# Patient Record
Sex: FEMALE | Race: WHITE | HISPANIC OR LATINO | Employment: UNEMPLOYED | URBAN - METROPOLITAN AREA
[De-identification: names, ages, dates, MRNs, and addresses within clinical notes are randomized per-mention and may not be internally consistent; named-entity substitution may affect disease eponyms.]

---

## 2017-01-09 ENCOUNTER — HOSPITAL ENCOUNTER (EMERGENCY)
Facility: CLINIC | Age: 22
Discharge: HOME | End: 2017-01-09
Attending: EMERGENCY MEDICINE | Admitting: EMERGENCY MEDICINE
Payer: COMMERCIAL

## 2017-01-09 VITALS
DIASTOLIC BLOOD PRESSURE: 78 MMHG | RESPIRATION RATE: 16 BRPM | SYSTOLIC BLOOD PRESSURE: 127 MMHG | TEMPERATURE: 98.3 F | OXYGEN SATURATION: 99 %

## 2017-01-09 DIAGNOSIS — T74.21XA SEXUAL ASSAULT OF ADULT, INITIAL ENCOUNTER: ICD-10-CM

## 2017-01-09 LAB
ALBUMIN UR-MCNC: 30 MG/DL
APPEARANCE UR: CLEAR
BILIRUB UR QL STRIP: NEGATIVE
COLOR UR AUTO: ABNORMAL
GLUCOSE UR STRIP-MCNC: NEGATIVE MG/DL
HCG UR QL: NEGATIVE
HCG UR QL: NEGATIVE
HGB UR QL STRIP: ABNORMAL
INTERNAL QC OK POCT: YES
KETONES UR STRIP-MCNC: 80 MG/DL
LEUKOCYTE ESTERASE UR QL STRIP: NEGATIVE
MUCOUS THREADS #/AREA URNS LPF: PRESENT /LPF
NITRATE UR QL: NEGATIVE
PH UR STRIP: 6 PH (ref 5–7)
RBC #/AREA URNS AUTO: >182 /HPF (ref 0–2)
SP GR UR STRIP: 1.03 (ref 1–1.03)
SQUAMOUS #/AREA URNS AUTO: 3 /HPF (ref 0–1)
URN SPEC COLLECT METH UR: ABNORMAL
UROBILINOGEN UR STRIP-MCNC: NORMAL MG/DL (ref 0–2)
WBC #/AREA URNS AUTO: 14 /HPF (ref 0–2)

## 2017-01-09 PROCEDURE — 96372 THER/PROPH/DIAG INJ SC/IM: CPT | Performed by: EMERGENCY MEDICINE

## 2017-01-09 PROCEDURE — 99285 EMERGENCY DEPT VISIT HI MDM: CPT | Mod: 25 | Performed by: EMERGENCY MEDICINE

## 2017-01-09 PROCEDURE — 81025 URINE PREGNANCY TEST: CPT | Performed by: EMERGENCY MEDICINE

## 2017-01-09 PROCEDURE — 25000132 ZZH RX MED GY IP 250 OP 250 PS 637: Performed by: EMERGENCY MEDICINE

## 2017-01-09 PROCEDURE — 99284 EMERGENCY DEPT VISIT MOD MDM: CPT | Mod: Z6 | Performed by: EMERGENCY MEDICINE

## 2017-01-09 PROCEDURE — 25900017 H RX MED GY IP 259 OP 259 PS 637: Performed by: EMERGENCY MEDICINE

## 2017-01-09 PROCEDURE — 81001 URINALYSIS AUTO W/SCOPE: CPT | Performed by: EMERGENCY MEDICINE

## 2017-01-09 PROCEDURE — 25000125 ZZHC RX 250: Performed by: EMERGENCY MEDICINE

## 2017-01-09 RX ORDER — AZITHROMYCIN 250 MG/1
1000 TABLET, FILM COATED ORAL ONCE
Status: COMPLETED | OUTPATIENT
Start: 2017-01-09 | End: 2017-01-09

## 2017-01-09 RX ORDER — LEVONORGESTREL 1.5 MG/1
1.5 TABLET ORAL ONCE
Status: DISCONTINUED | OUTPATIENT
Start: 2017-01-09 | End: 2017-01-09

## 2017-01-09 RX ORDER — CEFTRIAXONE SODIUM 1 G
250 VIAL (EA) INJECTION ONCE
Status: COMPLETED | OUTPATIENT
Start: 2017-01-09 | End: 2017-01-09

## 2017-01-09 RX ORDER — METRONIDAZOLE 500 MG/1
2000 TABLET ORAL ONCE
Qty: 4 TABLET | Refills: 0 | Status: SHIPPED | OUTPATIENT
Start: 2017-01-09 | End: 2017-01-09

## 2017-01-09 RX ORDER — ONDANSETRON 8 MG/1
8 TABLET, ORALLY DISINTEGRATING ORAL ONCE
Status: COMPLETED | OUTPATIENT
Start: 2017-01-09 | End: 2017-01-09

## 2017-01-09 RX ORDER — EMTRICITABINE AND TENOFOVIR DISOPROXIL FUMARATE 200; 300 MG/1; MG/1
1 TABLET, FILM COATED ORAL DAILY
Qty: 28 TABLET | Refills: 0 | Status: SHIPPED | OUTPATIENT
Start: 2017-01-09 | End: 2021-10-27

## 2017-01-09 RX ORDER — ONDANSETRON 4 MG/1
4 TABLET, ORALLY DISINTEGRATING ORAL EVERY 8 HOURS PRN
Qty: 20 TABLET | Refills: 0 | Status: SHIPPED | OUTPATIENT
Start: 2017-01-09 | End: 2017-01-12

## 2017-01-09 RX ADMIN — AZITHROMYCIN 1000 MG: 250 TABLET, FILM COATED ORAL at 22:08

## 2017-01-09 RX ADMIN — CEFTRIAXONE SODIUM 250 MG: 1 INJECTION, POWDER, FOR SOLUTION INTRAMUSCULAR; INTRAVENOUS at 22:33

## 2017-01-09 RX ADMIN — ONDANSETRON 8 MG: 8 TABLET, ORALLY DISINTEGRATING ORAL at 22:08

## 2017-01-09 RX ADMIN — ULIPRISTAL ACETATE 30 MG: 30 TABLET ORAL at 22:09

## 2017-01-09 RX ADMIN — Medication 1 PACKAGE: at 22:46

## 2017-01-09 ASSESSMENT — ENCOUNTER SYMPTOMS
WOUND: 1
ANAL BLEEDING: 1
RECTAL PAIN: 1
FEVER: 0
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
## 2017-01-09 NOTE — ED AVS SNAPSHOT
[facility], [place], Emergency Department    [address] [place] [place] [address]    Phone:  [phone]    Fax:  [phone]                                       Sarah Churchill   MRN: [id number]    Department:  [facility], Emergency Department   Date of Visit:  1/9/2017           After Visit Summary Signature Page     I have received my discharge instructions, and my questions have been answered. I have discussed any challenges I see with this plan with the nurse or doctor.    ..........................................................................................................................................  Patient/Patient Representative Signature      ..........................................................................................................................................  Patient Representative Print Name and Relationship to Patient    ..................................................               ................................................  Date                                            Time    ..........................................................................................................................................  Reviewed by Signature/Title    ...................................................              ..............................................  Date                                                            Time

## 2017-01-10 NOTE — DISCHARGE INSTRUCTIONS
See your primary care physician in one week as directed  Take the HIV prophylaxis medications as directed

## 2018-01-07 ENCOUNTER — HEALTH MAINTENANCE LETTER (OUTPATIENT)
Age: 23
End: 2018-01-07

## 2019-02-07 ENCOUNTER — TRANSFERRED RECORDS (OUTPATIENT)
Dept: HEALTH INFORMATION MANAGEMENT | Facility: CLINIC | Age: 24
End: 2019-02-07

## 2019-02-07 LAB — PAP-ABSTRACT: NORMAL

## 2019-03-09 ENCOUNTER — HEALTH MAINTENANCE LETTER (OUTPATIENT)
Age: 24
End: 2019-03-09

## 2020-03-10 ENCOUNTER — HEALTH MAINTENANCE LETTER (OUTPATIENT)
Age: 25
End: 2020-03-10

## 2020-12-27 ENCOUNTER — HEALTH MAINTENANCE LETTER (OUTPATIENT)
Age: 25
End: 2020-12-27

## 2021-04-24 ENCOUNTER — HEALTH MAINTENANCE LETTER (OUTPATIENT)
Age: 26
End: 2021-04-24

## 2021-09-03 ENCOUNTER — OFFICE VISIT (OUTPATIENT)
Dept: URGENT CARE | Facility: URGENT CARE | Age: 26
End: 2021-09-03

## 2021-09-03 VITALS
SYSTOLIC BLOOD PRESSURE: 138 MMHG | HEART RATE: 78 BPM | DIASTOLIC BLOOD PRESSURE: 86 MMHG | TEMPERATURE: 98.3 F | OXYGEN SATURATION: 100 %

## 2021-09-03 DIAGNOSIS — Z72.0 TOBACCO USE: ICD-10-CM

## 2021-09-03 DIAGNOSIS — H60.332 ACUTE SWIMMER'S EAR OF LEFT SIDE: Primary | ICD-10-CM

## 2021-09-03 PROCEDURE — 99203 OFFICE O/P NEW LOW 30 MIN: CPT | Performed by: NURSE PRACTITIONER

## 2021-09-03 RX ORDER — OFLOXACIN 3 MG/ML
10 SOLUTION AURICULAR (OTIC) DAILY
Qty: 4 ML | Refills: 0 | Status: SHIPPED | OUTPATIENT
Start: 2021-09-03 | End: 2021-09-10

## 2021-09-03 NOTE — PATIENT INSTRUCTIONS
Patient Education     External Ear Infection (Adult)    External otitis (also called  swimmer s ear ) is an infection in the ear canal. It's often caused by bacteria or fungus. It can occur a few days after water gets trapped in the ear canal (from swimming or bathing). It can also occur after cleaning too deeply in the ear canal with a cotton swab or other object. Sometimes, hair care products get into the ear canal and cause this problem.   Symptoms can include pain, fever, itching, redness, drainage, or swelling of the ear canal. Temporary hearing loss may also occur.   Home care    Don't try to clean the ear canal. This can push pus and bacteria deeper into the canal.    Use prescribed ear drops as directed. These help reduce swelling and fight the infection. If an ear wick was placed in the ear canal, apply drops right onto the end of the wick. The wick will draw the medicine into the ear canal even if it's swollen closed.    A cotton ball may be loosely placed in the outer ear to absorb any drainage.    You may use over-the-counter medicines to control pain as directed by the healthcare provider, unless another medicine was prescribed. Talk with your provider before using these medicines if you have chronic liver or kidney disease or ever had a stomach ulcer or digestive tract bleeding.    Don't allow water to get into your ear when bathing. Also don't swim until the infection has cleared.    Prevention    Keep your ears dry. This helps lower the risk of infection. Dry your ears with a towel or hair dryer after getting wet. Also, use ear plugs when swimming.    Don't stick any objects in the ear to remove wax.    Talk with your provider about using ear drops to prevent swimmer's ear in case you feel water trapped in your ear canal. You can get these drops over the counter at most drugstores. They work by removing water from the ear canal.    Follow-up care  Follow up with your healthcare provider in 1 week,  or as advised.   When to seek medical advice  Call your healthcare provider right away if any of these occur:     Ear pain becomes worse or doesn t improve after 3 days of treatment    Redness or swelling of the outer ear occurs or gets worse    Headache    Fever of 100.4 F (38 C) or higher, or as directed by your healthcare provider  Call 911  Call 911 or get immediate medical care if any of the following occur:     Seizure    Unusual drowsiness or confusion    Unusual painful or stiff neck    [employer] last reviewed this educational content on 8/1/2020 2000-2021 [employer]. All rights reserved. This information is not intended as a substitute for professional medical care. Always follow your healthcare professional's instructions.

## 2021-09-03 NOTE — PROGRESS NOTES
Assessment & Plan     Acute swimmer's ear of left side    - ofloxacin (FLOXIN) 0.3 % otic solution  Dispense: 4 mL; Refill: 0    Tobacco use       Discussed swimmer's ear, otitis externa. Prescription sent to pharmacy for ofloxacin daily for 7-10 days, may stop when asymptomatic for 48 hours. Rest, warm pack, tylenol, ibuprofen as needed. No swimming until symptoms resolve. Tobacco cessation encouraged.    Follow-up with PCP if symptoms persist for 3 days, and sooner if symptoms worsen or new symptoms develop.     Discussed red flag symptoms which warrant immediate visit in emergency room    All questions were answered and patient verbalized understanding. AVS reviewed with patient.     Sherrill Smith, DNP, APRN, CNP 9/3/2021 6:09 PM  [facility]            Baljeet Smith is a 25 year old female who presents to clinic today for the following health issues:  Chief Complaint   Patient presents with     Ear Problem     Left ear has pain. Pain travels down the neck and the jaw. Started 3 days ago.      Patient presents for evaluation of left ear pain for the past 3 days which has been worsening. Pain radiates down to her left neck. Denies fever, chills, cough, runny nose. She tried over the counter ear pain drops which help temporarily. No recent swimming. She was diagnosed with right otitis externa which resolved on its own a few weeks ago. Pain is not worse with swallowing. She does use tobacco.     Problem list, Medication list, Allergies, and Medical history reviewed in EPIC.    ROS:  Review of systems negative except for noted above        Objective    /86   Pulse 78   Temp 98.3  F (36.8  C) (Tympanic)   SpO2 100%   Physical Exam  Constitutional:       General: She is not in acute distress.     Appearance: She is not toxic-appearing or diaphoretic.   HENT:      Head: Normocephalic and atraumatic.      Right Ear: Tympanic membrane, ear canal and external ear normal. No  mastoid tenderness.      Left Ear: Tympanic membrane normal. No mastoid tenderness.      Ears:      Comments: Tenderness with palpation left tragus and pinna. Left ear canal erythematous, moderately swollen, with purulent drainage     Nose: Nose normal.      Mouth/Throat:      Mouth: Mucous membranes are moist.      Pharynx: Oropharynx is clear. No oropharyngeal exudate or posterior oropharyngeal erythema.   Eyes:      General:         Right eye: No discharge.         Left eye: No discharge.   Lymphadenopathy:      Cervical: No cervical adenopathy.   Neurological:      Mental Status: She is alert.

## 2021-10-09 ENCOUNTER — HEALTH MAINTENANCE LETTER (OUTPATIENT)
Age: 26
End: 2021-10-09

## 2021-10-27 ENCOUNTER — OFFICE VISIT (OUTPATIENT)
Dept: FAMILY MEDICINE | Facility: CLINIC | Age: 26
End: 2021-10-27
Payer: MEDICAID

## 2021-10-27 VITALS
DIASTOLIC BLOOD PRESSURE: 70 MMHG | OXYGEN SATURATION: 99 % | WEIGHT: 234.4 LBS | BODY MASS INDEX: 43.13 KG/M2 | HEIGHT: 62 IN | HEART RATE: 75 BPM | TEMPERATURE: 99.4 F | SYSTOLIC BLOOD PRESSURE: 120 MMHG | RESPIRATION RATE: 16 BRPM

## 2021-10-27 DIAGNOSIS — E66.01 MORBID OBESITY (H): ICD-10-CM

## 2021-10-27 DIAGNOSIS — R23.4 BREAST SKIN CHANGES: Primary | ICD-10-CM

## 2021-10-27 DIAGNOSIS — Z72.0 TOBACCO USE: ICD-10-CM

## 2021-10-27 PROCEDURE — 99203 OFFICE O/P NEW LOW 30 MIN: CPT | Performed by: NURSE PRACTITIONER

## 2021-10-27 ASSESSMENT — ENCOUNTER SYMPTOMS
CARDIOVASCULAR NEGATIVE: 1
PSYCHIATRIC NEGATIVE: 1
NEUROLOGICAL NEGATIVE: 1
CONSTITUTIONAL NEGATIVE: 1
EYES NEGATIVE: 1
GASTROINTESTINAL NEGATIVE: 1
ALLERGIC/IMMUNOLOGIC NEGATIVE: 1
MUSCULOSKELETAL NEGATIVE: 1
RESPIRATORY NEGATIVE: 1

## 2021-10-27 ASSESSMENT — MIFFLIN-ST. JEOR: SCORE: 1771.48

## 2021-10-27 ASSESSMENT — PAIN SCALES - GENERAL: PAINLEVEL: NO PAIN (0)

## 2021-10-27 NOTE — PATIENT INSTRUCTIONS
Over the counter Debrox ear drops to help with ear wax removal    Learning to [place]      Coping is the key to successfully living a life without cigarettes.    You have unconsciously connected smoking with many behaviors and feeling that you experience every day of your life.  Engaging in that behavior or experiencing that feeling automatically triggers a desire for a cigarette. Unless you do something to prevent those urges from occurring and learn to deal with the urges that do occur, you may be tempted back to smoking. Coping breaks all those connections and allows you to live a life free of cigarettes.  Coping does not mean that you have to completely stop living your life and join a monastery! It does mean that you must work at changing how you do many of the routines that prompt you to smoke. It also means changing how you think in those tempting situations.  These techniques are all simple and doable. But they are powerful. Research and practical experience has proven time and again that these techniques help to eliminate urges as well as give you the tools to deal with urges that manage to slip through.  Throughout the next few pages you will find literally hundreds of suggestions on how to deal with situations that trigger most smokers to smoke.  Think about the situations where you have been especially tempted to smoke in the past. Refer to the coping suggestions for each of those situations. Then, determine the best coping choices for you. These techniques will be your  weapons of choice  the next time you encounter that situation. It is important to pick one coping technique to change what you do and one to change how you think for each trigger. Combining techniques makes them even stronger and increases your ability to successfully cope.  Even though there are plenty of excellent coping suggestions here, these are by no means all the techniques that exist. So, if you have an idea that s not listed here  don t be afraid to use it. Be creative!  Finally remember that no matter how many excellent ideas you come up with you must actually put them into practice. Work at this for at least six to eight weeks and you ll quickly learn to deal with any tempting situation that may come along!        Coping Menu    Preventing Urges    There are many things you can do before you get into a tempting situation to eliminate the urge to smoke.    Visualize yourself comfortably dealing with the situation without a cigarette.    Plan ahead. Know what you will do in any given situation before you encounter it. Practice that plan often.    Avoid the situation until you feel you can deal with it.    Change the routines you associate with smoking as much as possible.    Rethink your belief that smoking somehow makes your life better or helps you deal with all your problems.    Begin an exercise program. If you can t do anything else just walk as briskly as you can every day for half an hour.    Keep yourself busy. Avoid boring situations where you may begin to think about smoking.    Remind yourself often that you are happy being a nonsmoker and that life is much better without cigarettes.  Coping with temptation  However, sometimes the urge manages to come through. You must be ready to cope with that urge as it s happening. The following suggestions will help you deal with that urge so you aren t tempted back to cigarettes.  General Suggestions    Deep Breathing. Every time an urge hits take in a slow deep breath, hold it for three to five seconds and then slowly exhale.    Drink a glass of water.    Talk about the urge. Call your support person or let people around you know you need to talk for a few minutes.    Escape the situation. Leave until you feel comfortable going back.    Picture a stop sign in your head or say the word loudly to yourself.    Count to twenty!    Say to yourself,  I am in control  or  I can get through  this.     Just accept the though. It s natural that you will have thoughts about cigarettes once you quit. Don t make a big deal out of them. Say to yourself  So what  and let the thought go.    Specific Situations  After Meals    Get up from the table as soon as you are done eating    Brush your teeth after every meal    Always sit in the nonsmoking section of a restaurant    At home have dessert and coffee in a different place from dinner    Take a short walk after each meal  Alcohol    Explore alternative ways to socialize with friends  o Go to a movie  o Work out together  o Have a party without alcohol    If you choose to drink  o Change what you usually drink  o Limit yourself to one or two drinks  o Talk about the urges when they occur  o Leave the bar periodically for fresh air (Do some deep breathing while outside).    Decide not to go to a bar for at least the first few weeks of your quit    Remind yourself that you can have fun without drinking. Millions of people do it all the time!  Boredom    Always carry a book/newspaper/crossword puzzle with you    Plan ahead so that you will not have long periods of inactivity    Learn to enjoy doing nothing from time to time. You do not always have to be doing something important    Use idle time to make the grocery list, plan your schedule or write letters    Start a new hobby or begin an exercise program to fill the time.  Breaks    Take your break at a different time    Change the place where you take your break    Take a short walk instead of staying indoors    Do a crossword puzzle or read a novel    Realize that you don t need an excuse to take a break. You deserve it!      Car    Choose a slightly different route for routine trips    Remove the ashtray from the car    Listen to a talk radio station or books on tape to keep your mind occupied    Use public transportation for the first few weeks after you quit    Change the environment in the car. Clean the  entire interior; get new seat covers, put up a no smoking sign, etc.  Coffee    Drink a flavored coffee or a different brand    Drink coffee out of a glass, paper cup, or the good china you never use    Change where you have your coffee breaks at work    If you always have your morning coffee at home have it at a café or at work    Drink tea instead of coffee  Evenings    Find projects to do while at home. Clean out the basement, refinish furniture, etc.    Keep yourself occupied while watching TV. Do puzzles, make out the grocery list, read a magazine    Visit family or friends instead of staying at home    Begin a new hobby or volunteer at a worthwhile organization    Start an exercise program. If you can t do anything else, take a brisk half hour walk each night  Hand/Mouth    Use cinnamon sticks (the kind used for cider)    Suck on sugar free candy    Use straws/swizzle sticks/tooth picks    Chew strong tangy sugar free gum    Eat carrots or celery sticks  Living with another smoker    Negotiate with the other smoker about where and when he/she will smoke. Do not make demands    Have the other smoker keep his/her cigarettes where you will not be able to find them    Practice saying  No thank you, I don t  smoke   just in case someone offers you a cigarette    Don t drink alcohol or limit yourself to one or two drinks    Have a support person with you at the party    Stress Management    Separate cigarettes from the situation. Realize that smoking never made a situation any better or helped you deal with it.    Step back, take a deep breath, and say to yourself,  I can handle this.  Then deal with the problem.    Strategize about how to handle stressful situations with friends, relatives or trusted clergy before encountering those situations.    Realize that every problem has a solution that does not involve smoking.    Begin an exercise program, take a formal stress management class or learn to meditate.    Telephone    Stand instead of sit    Move the location of the phone    If you don t already have one, get a portable phone or a cell phone    Hold the phone in the hand opposite of the one you usually use    Limit your time on the phone (use email instead)!  Thoughts about smoking    Just because you think about something does not mean you have to do it. Remember, if you did everything you ever thought about you would be in [facility] right now!!    Don t focus on the thought. Distract yourself:  o Say to yourself  I am in control  and let the thought go  o Remind yourself of the benefits of quitting  o Think of the reason you quit. Focus on that  o Say the word stop or picture a stop sign    Accept the thoughts. You naturally will be thinking about cigarettes for a while after you quit. Say to yourself,  So what  and move on    See yourself in your mind s eye as a successful nonsmoker. Practice seeing yourself in all kinds of situations dealing effectively without smoking    Give the smoker one ashtray. They will keep this ashtray clean and out of your sight when not in use    Determine a reasonable length of time for these changes    Surprise the smoker with a special dinner or gift at the end of your first month of quitting as a thank you for their cooperation.  Morning Routine    Change the order of your routine    Jump into the shower as soon as you get up    Eat something for breakfast if you normally do not    If you listen to the radio turn on the TV or vice versa    Look into the mirror first thing each morning and say,  I m proud to be a nonsmoker!   Negative Moods    Rethink your belief that cigarettes will calm or relax you    Ask yourself how a cigarette will make the situation any better    Do deep breathing throughout the day    As you do the deep breathing, think calming thoughts. Say to yourself,  I can get through this  or simply  I am calm.     Realize that smoking does not hurt anyone but yourself.  Smoking is not a good way to  get back  at anyone or to punish someone you are angry with  Other Smokers    Avoid places where you know people are smoking for the first few weeks of your quit    Leave the scene from time to time if you have to be in a smoking environment    Politely explain to the person that you are trying to quit and ask them not to smoke around you    Ask yourself what is still appealing about seeing other people smoke    Realize that the smoker is not happier or having more fun than you are just because they are smoking  Parties/Socializing    Before you go develop and practice a plan to deal with situations    Rehearse going to the function. Close your eyes and see yourself having a good time, meeting people, and enjoying the music all without a cigarette  Weight Gain    Do not diet.  Attempting two major behavior changes at the same time usually leads to failure at both. Wait at least two or three months after quitting before tackling any weight loss program.    Remember, the average weight gain, as a direct result of quitting, is only five to seven pounds. Any weight gain over and above that is due to behavioral changes on the part of the quitter    Drink six to eight glasses of water a day    Begin a modest exercise program. If you can do nothing else, take a brisk half hour walk every day    Remember, smoking does not turn your body into a fat burning machine. If it did, every smoker would be about 100 pounds!!!        Work    Rearrange your office or work space if you can    Put a  No Smoking  sign or motivation poster in your work area    Change your work routine as much as possible    Listen to music, talk radio, or tapes    Have a support person at work      [facility]     Discharged by : Dianelys Waller, JUDY, APRN, CNP   Paper scripts provided to patient : none     If you have any questions regarding your visit please contact your care team:     Team [place]               Clinic Hours Telephone Number     Dr. Ana Waller, LUZ MARIA [facility]   7am-7pm  Monday - Thursday   7am-5pm  Fridays  [phone]   (Appointment scheduling available 24/7)     RN Line  [phone] option 2     Urgent Care - [place] - 11am-9pm Monday-Friday Saturday-Sunday- 9am-5pm     [place] -   5pm-9pm Monday-Friday Saturday-Sunday- 9am-5pm    [phone] - [place]    [phone] - [place]     For a Price Quote for your services, please call our Consumer Price Line at [phone].     What options do I have for visits at the clinic other than the traditional office visit?     To expand how we care for you, many of our providers are utilizing electronic visits (e-visits) and telephone visits, when medically appropriate, for interactions with their patients rather than a visit in the clinic. We also offer nurse visits for many medical concerns. Just like any other service, we will bill your insurance company for this type of visit based on time spent on the phone with your provider. Not all insurance companies cover these visits. Please check with your medical insurance if this type of visit is covered. You will be responsible for any charges that are not paid by your insurance.     E-visits via [employer]: generally incur a $45.00 fee.     Telephone visits:  Time spent on the phone: *charged based on time that is spent on the phone in increments of 10 minutes. Estimated cost:   5-10 mins $30.00   11-20 mins. $59.00   21-30 mins. $85.00       Use [employer] (secure email communication and access to your chart) to send your primary care provider a message or make an appointment. Ask someone on your Team how to sign up for [employer].     As always, Thank you for trusting us with your health care needs!      [facility]:    Scheduling Appointments  [facility]  Call: [phone]    [facility]  Call: [phone]    [facility]  Call: [phone]    For Gastroenterology referrals   [facility]   Clinics and Surgery Center, 4th Floor   [address] [place] [address]   Appointments: [phone]    WHERE TO GO FOR CARE?    Clinic    Make an appointment if you:       Are sick (cold, cough, flu, sore throat, earache or in pain).       Have a small injury (sprain, small cut, burn or broken bone).       Need a physical exam, Pap smear, vaccine or prescription refill.       Have questions about your health or medicines.    To reach us:      Call [phone] ([phone]). Open 24 hours every day. (For counseling services, call [phone].)    Log into [employer] at [employer]. (Visit [employer] to create an account.) Hospital emergency room    An emergency is a serious or life- threatening problem that must be treated right away.    Call 811 or get to the hospital if you have:      Very bad or sudden:            - Chest pain or pressure         - Bleeding         - Head or belly pain         - Dizziness or trouble seeing, walking or                          Speaking      Problems breathing      Blood in your vomit or you are coughing up blood      A major injury (knocked out, loss of a finger or limb, rape, broken bone protruding from skin)    A mental health crisis. (Or call the Mental Health Crisis line at 1-767.108.3212 or Suicide Prevention Hotline at 1-583.705.4790.)    Open 24 hours every day. You don't need an appointment.     Urgent care    Visit urgent care for sickness or small injuries when the clinic is closed. You don't need an appointment. To check hours or find an urgent care near you, visit [email or web address]. Online care    Get online care from [facility] for more than 70 common problems, like colds, allergies and infections. Open 24 hours every day at:   [email or web address]   Need help deciding?    For advice about  where to be seen, you may call your clinic and ask to speak with a nurse. We're here for you 24 hours every day.         If you are deaf or hard of hearing, please let us know. We provide many free services including sign language interpreters, oral interpreters, TTYs, telephone amplifiers, note takers and written materials.

## 2021-10-27 NOTE — Clinical Note
Please abstract the following data from this visit with this patient into the appropriate field in Epic:    Tests that can be patient reported without a hard copy:    {Quality Abstract List (Optional):836208}    Other Tests found in the patient's chart through Chart Review/Care Everywhere:    Pap smear done by [facility] this date: 2/7/19 normal    Note to [employer]straction: If this section is blank, no results were found via Chart Review/Care Everywhere.

## 2021-10-27 NOTE — PROGRESS NOTES
"  Assessment & Plan     Breast skin changes  - US Breast Left Complete 4 Quadrants  -Breast exam done this visit    Morbid obesity (H)  Known issue that I take into account for their medical decisions, no current exacerbations or new concerns.   -Healthy diet, exercise, and advised to quit smoking    Tobacco use  -Advised to quit smoking  -Inverted nipple more common with tobacco use               Tobacco Cessation:   reports that she has been smoking cigarettes. She has been smoking about 0.00 packs per day for the past 3.00 years. She has never used smokeless tobacco.  Tobacco Cessation Action Plan: Information offered: Patient not interested at this time    BMI:   Estimated body mass index is 42.87 kg/m  as calculated from the following:    Height as of this encounter: 1.575 m (5' 2\").    Weight as of this encounter: 106.3 kg (234 lb 6.4 oz).   Weight management plan: Discussed healthy diet and exercise guidelines        Return in about 2 weeks (around 11/10/2021) for Physical Exam.    Lori Zaragoza, Student NP acting as a scribe for Dianelys Waller DNP, APRN, CNP    The above patient was seen and evaluated with the NP student who acted as my scribe for the above note.    Dianelys Waller DNP, APRN, CNP     [facility]    Baljeet Smith is a 25 year old who presents for the following health issues     HPI     Left breast, skin feels thicker, nipple was inverted for awhile for about 2 months.  Hurts with touching, nipple inverted, and had itching and dryness that all started together at the same time. No new skin products or allergies. Left nipple is not inverted anymore, more dry skin but seems more dense on the right side of the left nipple compared to the right nipple. Patient is currently a smoker.     -Plan for ultrasound with symptoms that patient reported may be beneficial      Review of Systems   Constitutional: Negative.    HENT: Negative.    Eyes: Negative.    Respiratory: " "Negative.    Cardiovascular: Negative.    Gastrointestinal: Negative.    Breasts:         Left nipple has more dense tissue verus right nipple   Genitourinary: Negative.    Musculoskeletal: Negative.    Skin:        Dry skin/peeling on left nipple   Allergic/Immunologic: Negative.    Neurological: Negative.    Psychiatric/Behavioral: Negative.          Objective    /70 (BP Location: Right arm)   Pulse 75   Temp 99.4  F (37.4  C) (Oral)   Resp 16   Ht 1.575 m (5' 2\")   Wt 106.3 kg (234 lb 6.4 oz)   SpO2 99%   BMI 42.87 kg/m    Body mass index is 42.87 kg/m .  Physical Exam   GENERAL: healthy, alert and no distress  EYES: Eyes grossly normal to inspection, PERRL and conjunctivae and sclerae normal  HENT: ear canals and TM's normal, nose and mouth without ulcers or lesions  NECK: no adenopathy, no asymmetry, masses, or scars and thyroid normal to palpation  RESP: lungs clear to auscultation - no rales, rhonchi or wheezes  ABDOMEN: soft, nontender, no hepatosplenomegaly, no masses and bowel sounds normal  SKIN: no suspicious lesions or rashes  NEURO: Normal strength and tone, mentation intact and speech normal  PSYCH: mentation appears normal, affect normal/bright  Breasts:  Right breast normal without masses, tenderness or nipple discharge and no palpable axillary masses or adenopathy  Left breast without masses, tenderness or nipple discharge and no palpable axillary masses or adenopathy.  No skin changes other than mild dry skin medial to the areola.  Under the areola breast tissue feels more dense            "

## 2022-05-13 ENCOUNTER — OFFICE VISIT (OUTPATIENT)
Dept: URGENT CARE | Facility: URGENT CARE | Age: 27
End: 2022-05-13
Payer: COMMERCIAL

## 2022-05-13 ENCOUNTER — HOSPITAL ENCOUNTER (OUTPATIENT)
Facility: CLINIC | Age: 27
Discharge: HOME | End: 2022-05-13
Attending: FAMILY MEDICINE | Admitting: FAMILY MEDICINE
Payer: COMMERCIAL

## 2022-05-13 VITALS
WEIGHT: 234 LBS | TEMPERATURE: 98 F | DIASTOLIC BLOOD PRESSURE: 81 MMHG | HEART RATE: 85 BPM | BODY MASS INDEX: 42.8 KG/M2 | SYSTOLIC BLOOD PRESSURE: 128 MMHG | OXYGEN SATURATION: 98 %

## 2022-05-13 DIAGNOSIS — J06.9 UPPER RESPIRATORY TRACT INFECTION, UNSPECIFIED TYPE: ICD-10-CM

## 2022-05-13 DIAGNOSIS — J01.90 ACUTE SINUSITIS, RECURRENCE NOT SPECIFIED, UNSPECIFIED LOCATION: Primary | ICD-10-CM

## 2022-05-13 PROCEDURE — U0005 INFEC AGEN DETEC AMPLI PROBE: HCPCS

## 2022-05-13 PROCEDURE — 99213 OFFICE O/P EST LOW 20 MIN: CPT | Mod: CS | Performed by: FAMILY MEDICINE

## 2022-05-13 PROCEDURE — U0003 INFECTIOUS AGENT DETECTION BY NUCLEIC ACID (DNA OR RNA); SEVERE ACUTE RESPIRATORY SYNDROME CORONAVIRUS 2 (SARS-COV-2) (CORONAVIRUS DISEASE [COVID-19]), AMPLIFIED PROBE TECHNIQUE, MAKING USE OF HIGH THROUGHPUT TECHNOLOGIES AS DESCRIBED BY CMS-2020-01-R: HCPCS

## 2022-05-13 RX ORDER — FLUTICASONE PROPIONATE 50 MCG
2 SPRAY, SUSPENSION (ML) NASAL DAILY
Qty: 18.2 ML | Refills: 0 | Status: SHIPPED | OUTPATIENT
Start: 2022-05-13 | End: 2023-02-09

## 2022-05-13 NOTE — PROGRESS NOTES
SUBJECTIVE:   Sarah Churchill is a 26 year old female presenting with   Chief Complaint   Patient presents with     Urgent Care     Headache     C/O headache for 4 days     Symptoms started on Tuesday, 5/10 with cough, runny/stuffy nose, body aches, sore throat and headaches.   The body aches improved, the nose became more stuffy and the headache feels like a pressure/sinus headache.  No severe headache symptoms.  She has h/o migraine, but doesn't feel like a migraine to her.   No fevers.  No vision changes, no eye pain, no neck pain or stiffness.   Has been using occasional ibuprofen, excedrin and nyquil for her symptoms so far.   She did a home covid test on 5/11, which was negative.        OBJECTIVE  /81   Pulse 85   Temp 98  F (36.7  C)   Wt 106.1 kg (234 lb)   LMP 04/29/2022   SpO2 98%   BMI 42.80 kg/m    GENERAL:  Awake, alert and interactive. No acute distress.  HEENT:   NC/AT, EOMI, clear conjunctiva.  Nose is congested.  Oropharynx moist and clear.  TM's dull, scant effusions, and EAC's benign.  NECK: supple and free of adenopathy, non tender and moving through normal range of motion without discomfort.  CHEST:  Lungs are clear, no rhonchi, wheezing or rales. Normal symmetric air entry throughout both lung fields.   HEART:  S1 and S2 normal, no murmurs. Regular rate and rhythm.    Labs:  covid test collected.      ASSESSMENT/PLAN    ICD-10-CM    1. Acute sinusitis, recurrence not specified, unspecified location  J01.90 fluticasone (FLONASE) 50 MCG/ACT nasal spray   2. Upper respiratory tract infection, unspecified type  J06.9 Symptomatic; Unknown COVID-19 Virus (Coronavirus) by PCR Nose       Viral uri with sinus congestion suspected, leading to the headache symptoms.   covid test pending. We discussed the expected course of the illness and symptomatic cares in detail.   Advised to return to care if symptoms not improving as expected, do not resolve completely, or if any new or worsening  symptoms develop.    Patient Instructions   Start the nasal steroid today.      Anticipate symptoms improving over the next week and resolved by 2 weeks.      If any severe headache symptoms develop, see your primary provider right away.

## 2022-05-13 NOTE — PATIENT INSTRUCTIONS
Start the nasal steroid today.      Anticipate symptoms improving over the next week and resolved by 2 weeks.      If any severe headache symptoms develop, see your primary provider right away.

## 2022-05-14 LAB — SARS-COV-2 RNA RESP QL NAA+PROBE: NEGATIVE

## 2022-05-16 ENCOUNTER — HEALTH MAINTENANCE LETTER (OUTPATIENT)
Age: 27
End: 2022-05-16

## 2022-09-11 ENCOUNTER — HEALTH MAINTENANCE LETTER (OUTPATIENT)
Age: 27
End: 2022-09-11

## 2022-12-28 DIAGNOSIS — N91.2 AMENORRHEA: Primary | ICD-10-CM

## 2023-01-11 ENCOUNTER — LAB REQUISITION (OUTPATIENT)
Dept: LAB | Facility: CLINIC | Age: 28
End: 2023-01-11
Payer: COMMERCIAL

## 2023-01-11 DIAGNOSIS — O09.92 SUPERVISION OF HIGH RISK PREGNANCY, UNSPECIFIED, SECOND TRIMESTER: ICD-10-CM

## 2023-01-11 PROCEDURE — 82306 VITAMIN D 25 HYDROXY: CPT | Mod: ORL | Performed by: MIDWIFE

## 2023-01-11 PROCEDURE — 86901 BLOOD TYPING SEROLOGIC RH(D): CPT | Mod: ORL | Performed by: MIDWIFE

## 2023-01-11 PROCEDURE — 86780 TREPONEMA PALLIDUM: CPT | Mod: ORL | Performed by: MIDWIFE

## 2023-01-11 PROCEDURE — 87340 HEPATITIS B SURFACE AG IA: CPT | Mod: ORL | Performed by: MIDWIFE

## 2023-01-11 PROCEDURE — 86803 HEPATITIS C AB TEST: CPT | Mod: ORL | Performed by: MIDWIFE

## 2023-01-11 PROCEDURE — 86787 VARICELLA-ZOSTER ANTIBODY: CPT | Mod: ORL | Performed by: MIDWIFE

## 2023-01-11 PROCEDURE — 86762 RUBELLA ANTIBODY: CPT | Mod: ORL | Performed by: MIDWIFE

## 2023-01-11 PROCEDURE — 87186 SC STD MICRODIL/AGAR DIL: CPT | Mod: ORL | Performed by: MIDWIFE

## 2023-01-11 PROCEDURE — 86706 HEP B SURFACE ANTIBODY: CPT | Mod: ORL | Performed by: MIDWIFE

## 2023-01-11 PROCEDURE — 87389 HIV-1 AG W/HIV-1&-2 AB AG IA: CPT | Mod: ORL | Performed by: MIDWIFE

## 2023-01-12 ENCOUNTER — HOSPITAL ENCOUNTER (OUTPATIENT)
Dept: ULTRASOUND IMAGING | Facility: CLINIC | Age: 28
Discharge: HOME | End: 2023-01-12
Attending: ADVANCED PRACTICE MIDWIFE | Admitting: ADVANCED PRACTICE MIDWIFE
Payer: COMMERCIAL

## 2023-01-12 DIAGNOSIS — N91.2 AMENORRHEA: ICD-10-CM

## 2023-01-12 LAB
ABO/RH(D): NORMAL
ANTIBODY SCREEN: NEGATIVE
DEPRECATED CALCIDIOL+CALCIFEROL SERPL-MC: 9 UG/L (ref 20–75)
HBV SURFACE AB SERPL IA-ACNC: 5.34 M[IU]/ML
HBV SURFACE AB SERPL IA-ACNC: NONREACTIVE M[IU]/ML
HBV SURFACE AG SERPL QL IA: NONREACTIVE
HCV AB SERPL QL IA: NONREACTIVE
HIV 1+2 AB+HIV1 P24 AG SERPL QL IA: NONREACTIVE
RUBV IGG SERPL QL IA: 1.91 INDEX
RUBV IGG SERPL QL IA: POSITIVE
SPECIMEN EXPIRATION DATE: NORMAL
T PALLIDUM AB SER QL: NONREACTIVE
VZV IGG SER QL IA: 1185 INDEX
VZV IGG SER QL IA: POSITIVE

## 2023-01-12 PROCEDURE — 76801 OB US < 14 WKS SINGLE FETUS: CPT

## 2023-01-12 PROCEDURE — 76801 OB US < 14 WKS SINGLE FETUS: CPT | Mod: 26 | Performed by: RADIOLOGY

## 2023-01-13 LAB — BACTERIA UR CULT: ABNORMAL

## 2023-02-09 ENCOUNTER — PRENATAL OFFICE VISIT (OUTPATIENT)
Dept: NURSING | Facility: CLINIC | Age: 28
End: 2023-02-09
Payer: COMMERCIAL

## 2023-02-09 VITALS — BODY MASS INDEX: 42.8 KG/M2 | HEIGHT: 62 IN

## 2023-02-09 DIAGNOSIS — Z34.00 ENCOUNTER FOR SUPERVISION OF NORMAL FIRST PREGNANCY: Primary | ICD-10-CM

## 2023-02-09 DIAGNOSIS — Z23 NEED FOR TDAP VACCINATION: ICD-10-CM

## 2023-02-09 PROBLEM — O26.842 SIZE OF FETUS INCONSISTENT WITH DATES IN SECOND TRIMESTER: Status: ACTIVE | Noted: 2023-01-17

## 2023-02-09 PROBLEM — O09.92 HIGH-RISK PREGNANCY IN SECOND TRIMESTER: Status: ACTIVE | Noted: 2023-01-17

## 2023-02-09 PROBLEM — Z72.0 TOBACCO USE: Status: RESOLVED | Noted: 2021-10-27 | Resolved: 2023-02-09

## 2023-02-09 PROBLEM — O09.92 HIGH-RISK PREGNANCY IN SECOND TRIMESTER: Status: RESOLVED | Noted: 2023-01-17 | Resolved: 2023-02-09

## 2023-02-09 PROCEDURE — 99207 PR NO CHARGE NURSE ONLY: CPT

## 2023-02-09 RX ORDER — ASPIRIN 81 MG/1
1 TABLET, COATED ORAL
Status: ON HOLD | COMMUNITY
Start: 2023-01-15 | End: 2023-08-15

## 2023-02-09 RX ORDER — B-COMPLEX WITH VITAMIN C
1 TABLET ORAL DAILY
Status: ON HOLD | COMMUNITY
Start: 2022-12-27 | End: 2023-08-15

## 2023-02-09 NOTE — PROGRESS NOTES
Important Information for Provider:     New ob nurse intake by phone, first pregnancy. Started prenatal care at [facility], labs drawn, ultrasound performed 1/12/2023. Patient has irregular periods, ultrasound not consistent with LMP. Patient was diagnosis with UTI, took antibiotic and completed the treatment. Complains of vaginal itching on the outside, she will buy Monostat cream and apply on the outside. Told patient that urine and wet prep will be rechecked next week at NOB appointment 2/14/2023  Patient's vitamin D level was 9, Rx was not at the pharmacy. Told her to buy Vitamin D 2000 mg start taking it right away until she is seen by the CNM   Prenatal OB Questionnaire  Patient supplied answers from flow sheet for:  Prenatal OB Questionnaire.  Past Medical History  Have you ever recieved care for your mental health? : No  Have you ever been in a major accident or suffered serious trauma?: No  Within the last year, has anyone hit, slapped, kicked or otherwise hurt you?: No  In the last year, has anyone forced you to have sex when you didn't want to?: No    Past Medical History 2   Have you ever received a blood transfusion?: No  Would you accept a blood transfusion if was medically recommended?: Yes  Does anyone in your home smoke?: No   Is your blood type Rh negative?: No  Have you ever ?: No  Have you been hospitalized for a nonsurgical reason excluding normal delivery?: No  Have you ever had an abnormal pap smear?: No    Past Medical History (Continued)  Do you have a history of abnormalities of the uterus?: No  Did your mother take DERECK or any other hormones when she was pregnant with you?: No  Do you have any other problems we have not asked about which you feel may be important to this pregnancy?: No                     Allergies as of 2/9/2023:    Allergies as of 02/09/2023     (No Known Allergies)       Current medications are:  Current Outpatient Medications   Medication Sig Dispense Refill      ASPIRIN LOW DOSE 81 MG EC tablet Take 1 tablet by mouth daily at 2 pm       Prenatal Multivit-Min-Fe-FA (PRENATAL, W/IRON & FA,) 27-0.8 MG TABS Take 1 tablet by mouth daily       vitamin D3 (CHOLECALCIFEROL) 125 MCG (5000 UT) tablet Take 1 tablet by mouth daily (Patient not taking: Reported on 2/9/2023)           Early ultrasound screening tool:    Does patient have irregular periods?  No  Did patient use hormonal birth control in the three months prior to positive urine pregnancy test? No  Is the patient breastfeeding?  No  Is the patient 10 weeks or greater at time of education visit?  Yes

## 2023-02-14 ENCOUNTER — PRENATAL OFFICE VISIT (OUTPATIENT)
Dept: MIDWIFE SERVICES | Facility: CLINIC | Age: 28
End: 2023-02-14
Payer: COMMERCIAL

## 2023-02-14 VITALS
WEIGHT: 209.9 LBS | HEART RATE: 78 BPM | BODY MASS INDEX: 38.39 KG/M2 | SYSTOLIC BLOOD PRESSURE: 124 MMHG | OXYGEN SATURATION: 98 % | DIASTOLIC BLOOD PRESSURE: 78 MMHG

## 2023-02-14 DIAGNOSIS — E55.9 VITAMIN D DEFICIENCY: ICD-10-CM

## 2023-02-14 DIAGNOSIS — Z34.02 ENCOUNTER FOR SUPERVISION OF NORMAL FIRST PREGNANCY IN SECOND TRIMESTER: Primary | ICD-10-CM

## 2023-02-14 DIAGNOSIS — E66.01 MORBID OBESITY (H): ICD-10-CM

## 2023-02-14 PROBLEM — Z34.00 SUPERVISION OF NORMAL FIRST PREGNANCY, ANTEPARTUM: Status: ACTIVE | Noted: 2023-02-14

## 2023-02-14 LAB
ALBUMIN UR-MCNC: NEGATIVE MG/DL
APPEARANCE UR: CLEAR
BILIRUB UR QL STRIP: NEGATIVE
COLOR UR AUTO: YELLOW
ERYTHROCYTE [DISTWIDTH] IN BLOOD BY AUTOMATED COUNT: 13.3 % (ref 10–15)
GLUCOSE UR STRIP-MCNC: NEGATIVE MG/DL
HCT VFR BLD AUTO: 35.7 % (ref 35–47)
HGB BLD-MCNC: 12.2 G/DL (ref 11.7–15.7)
HGB UR QL STRIP: NEGATIVE
KETONES UR STRIP-MCNC: 40 MG/DL
LEUKOCYTE ESTERASE UR QL STRIP: NEGATIVE
MCH RBC QN AUTO: 31.3 PG (ref 26.5–33)
MCHC RBC AUTO-ENTMCNC: 34.2 G/DL (ref 31.5–36.5)
MCV RBC AUTO: 92 FL (ref 78–100)
NITRATE UR QL: NEGATIVE
PH UR STRIP: 6 [PH] (ref 5–7)
PLATELET # BLD AUTO: 248 10E3/UL (ref 150–450)
RBC # BLD AUTO: 3.9 10E6/UL (ref 3.8–5.2)
SP GR UR STRIP: >=1.03 (ref 1–1.03)
UROBILINOGEN UR STRIP-ACNC: 0.2 E.U./DL
WBC # BLD AUTO: 9.2 10E3/UL (ref 4–11)

## 2023-02-14 PROCEDURE — 86803 HEPATITIS C AB TEST: CPT | Performed by: ADVANCED PRACTICE MIDWIFE

## 2023-02-14 PROCEDURE — 99207 PR FIRST OB VISIT: CPT | Performed by: ADVANCED PRACTICE MIDWIFE

## 2023-02-14 PROCEDURE — 85027 COMPLETE CBC AUTOMATED: CPT | Performed by: ADVANCED PRACTICE MIDWIFE

## 2023-02-14 PROCEDURE — 36415 COLL VENOUS BLD VENIPUNCTURE: CPT | Performed by: ADVANCED PRACTICE MIDWIFE

## 2023-02-14 PROCEDURE — 81003 URINALYSIS AUTO W/O SCOPE: CPT | Performed by: ADVANCED PRACTICE MIDWIFE

## 2023-02-14 PROCEDURE — 87086 URINE CULTURE/COLONY COUNT: CPT | Performed by: ADVANCED PRACTICE MIDWIFE

## 2023-02-14 RX ORDER — CHOLECALCIFEROL (VITAMIN D3) 50 MCG
1 TABLET ORAL DAILY
Qty: 90 TABLET | Refills: 3 | Status: SHIPPED | OUTPATIENT
Start: 2023-02-14

## 2023-02-14 NOTE — PROGRESS NOTES
13w3d   NOB intake feeling well overall.  Accompanied by Juan, SEEMA.  E.Coli UTI noted 23 and pt reports taking full course of abx.  C/O vaginal itching a week ago , but pt states resolved and declines a wet prep today.      Unable to hear FHTs with doptone, brief BSUS couple and myself easily appreciated baby moving a lot and + CA.    Declined early genetic testing, will plan level 2 U/S for elevated BMI and centralized habitus.    Pt may like to be seen at [facility], given address.  RTC 4 wks Mi Cassidy, DARRYL CNANDREA       Sarah Churchill is a 27 year old female    Pt presents to clinic today for a NOB visit.  No LMP recorded. Patient is pregnant.. Estimated Date of Delivery: Aug 19, 2023 is calculated from early U/S alone (<14wks)     She has not had bleeding since her LMP.   She has had mild nausea. Weight loss has not occurred.   This was a planned pregnancy.   FOB is involved,  Juan      OTHER CONCERNS: Excited to be pregnant     Pt's hx of HSV is negative    ============================================  PERSONAL/SOCIAL HISTORY  Lives with partner.  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  Exercises no regular exercise program  Pt denies abuse and feels safe in her home and relationships.     REVIEW OF SYSTEMS  C: NEGATIVE for fever, chills, change in weight  I: NEGATIVE for worrisome rashes, moles or lesions  E/M: NEGATIVE for ear, mouth and throat problems  R: NEGATIVE for significant cough or SOB  CV: NEGATIVE for chest pain, palpitations or peripheral edema  GI: NEGATIVE for nausea, abdominal pain, heartburn, or change in bowel habits  : NEGATIVE for frequency, dysuria, or hematuria  PSYCHIATRIC:  NEGATIVE for depression, anxiety or other mental health concerns    PHYSICAL EXAM:  /78 (BP Location: Right arm, Patient Position: Sitting, Cuff Size: Adult Regular)   Pulse 78   Wt 95.2 kg (209 lb 14.4 oz)   SpO2 98%   BMI 38.39 kg/m    BMI- Body  mass index is 38.39 kg/m ., RECOMMENDED WEIGHT GAIN: < 15 lbs.  GENERAL:  Pleasant pregnant female, alert, cooperative and well groomed.  SKIN:  Warm and dry, without lesions or rashes  HEAD: Symmetrical features.  MOUTH:  Buccal mucosa pink, moist without lesions.  Teeth in good repair.    NECK:  Thyroid without enlargement and nodules.  Lymph nodes not palpable.   LUNGS:  Clear to auscultation.      HEART:  RRR without murmur.  ABDOMEN: Soft without masses , tenderness or organomegaly.  No CVA tenderness.  Uterus not palpable at size equal to dates.  No scars noted..  MUSCULOSKELETAL:  Full range of motion  EXTREMITIES:  No edema. No significant varicosities.     PELVIC EXAM:  deferred    ASSESSMENT:  Intrauterine pregnancy at 13w3d weeks gestation.     (E55.9) Vitamin D deficiency  (primary encounter diagnosis)  Comment:   Plan: vitamin D3 (CHOLECALCIFEROL) 50 mcg (2000         units) tablet            (Z34.00) Encounter for supervision of normal first pregnancy  Comment:   Plan:       PLAN:  Oriented to CNM service.    Instructed on use of triage nurse line and contacting the on call CNM after hours for an urgent need such as fever, vagina bleeding, bladder or vaginal infection, rupture of membranes,  or term labor.      Discussed the indications, uses for and false positives for quad screen  and fetal survey ultrasound at 18-20 weeks gestation. Will inform us at the next visit if she wished to avail herself of these screens.    Instructed on best evidence for: weight gain for her weight and height for pregnancy; healthy diet; exercise and activity during pregnancy; and maintenance of a generally healthy lifestyle.     Discussed the harms, benefits, side effects and alternative therapies for current prescribed and OTC medications.    Mi Cassidy M

## 2023-02-14 NOTE — PATIENT INSTRUCTIONS
Deapatito Smith     Congratulations on your pregnancy!! We are so pleased you have chosen us to partner with you for your prenatal care, thank you.    You are welcome to make your appointments with any one of us.  If you would like to try and meet us all, please see the list.  If it works out better for your schedule, or you prefer, you could make your appointments with just a few of us.  You can also see us at the [facility] on Wednesdays.    Here is a list of the Certified Nurse Midwives in our group;   - Khanh Elmore,   - Luis Mejía,   - Cassidy Hartmann,   - Judit Lee,   - Yolette Wiseman,   - Anali Eastman,  - Mi Cassidy.  We work as a team and share call at the hospital so it may be any one of us seven with you when you labor and birth.    If you have questions or concerns during your pregnancy please call us at [phone] and press option 1.  This routes you directly to our OB/Gyn clinic staff and nurses during business hours.  After hours your call will be routed to a central answering service and nurse line.    [employer] messages are great for communicating about non urgent matters.  If you are in labor, please call.  We would much rather connect with you then have you be at home and worried.    We wish you the very best, and we are excited to be part of your journey.    Mi Cassidy, DARRYL [facility]     Pregnancy Resources    Childbirth and Parenting Education:   [facility]: [email or web address]   [phone]  [place]: (education, yoga & wellness) www.Axceler.Lorena [email or web address]  Enlightened Mama: www.enlightenedmama.Lorena Gaxiola   Childbirth collective: (Parent topic nights)  www.childbirthcollective.org/  Hypnobabies:  www.hypnobabiestpeerTransfercities.com/  Hypnobirthing:  Http://hypnobirthing.com/    Information about doulas:  Childbirth collective: http://www.childbirthcollective.org/  Doulas of [place] (VIRGILIO):  www.virgilio.org  [facility]  project:  "http://twincitiesdoulaproject.com/       Book Recommendations:      [place]'s Birthing From Within--first few chapters include a new-age tone, you may prefer to skip it and keep going, because there is good stuff later.  This book recommendation covers emotional preparation, but does cover coping with pain, and use of both pharmacological and nonpharmacological methods.     Dr. Mahmood' The Pregnancy Book and The Birth Book--the pregnancy book goes month-by month     Womanly Art of Breastfeeding by La Leche League International   Bestfeeding by Dinah Parrish--great pictures     Mothering Your Nursing Toddler, by Darlene Vasquez.   Addresses dealing with so many of the challenging behaviors of a nursing toddler.     How Weaning Happens, by La Leche League.  Discusses weaning at all ages, from medically necessary weaning of an infant, all the way up to age 5 (or older), with why/why not, and strategies.  Very empowering book both for deciding to wean and deciding not to.    Internet Resources:     American College of Nurse-Midwives (ACNM) http://www.midwife.org/; look at the informational handouts at http://www.midwife.org/Share-With-Women     www.mymidwife.org     Mother to Baby (Medication and Herbal guidance in pregnancy): http://www.mothertobaby.org  Toll-Free Hotline: 634.350.3830     LactMed (Medication use while breastfeeding): http://toxnet.nlm.nih.gov/newtoxnet/lactmed.htm     Women's Health.gov:  http://www.womenshealth.gov/a-z-topics/index.html     American pregnancy association - http://americanpregnancy.org      Early Childhood and Family Education (ECFE):  ECFE offers parents hands-on learning experiences that will nourish a lifetime of teachable moments.  http://ecfe.info/ecfe-home/     March of Dimes www.WeatherBug - Nutrition  www.mypyramid.gov  Under \"For Consumers,\" click on \"pregnant and breastfeeding women.\"      Centers for Disease Control and Prevention (CDC) - Vaccines : " http://www.cdc.gov/vaccines/        When researching information on the web, question the validity of websites.  The domains .gov, .edu and.org tend to be more reliable information.  If there are a lot of advertisements, be cautious of the information provided. Blogs and chat rooms can often have incorrect information.

## 2023-02-15 LAB — HCV AB SERPL QL IA: NONREACTIVE

## 2023-02-16 LAB — BACTERIA UR CULT: NO GROWTH

## 2023-02-22 ENCOUNTER — MYC MEDICAL ADVICE (OUTPATIENT)
Dept: MIDWIFE SERVICES | Facility: CLINIC | Age: 28
End: 2023-02-22
Payer: COMMERCIAL

## 2023-02-22 NOTE — TELEPHONE ENCOUNTER
14w4d    Pt reporting severe headaches last few days with some neck pain  Has taken bath, rested, taken tylenol, repositioning, hydrating, etc with minimal relief  Mild L side vision blurriness   Denies new swelling or abd/rib pain.     Unsure if should do pre-e labs?  Routing to provider to advise.  Cece Hutchins RN on 2023 at 1:42 PM

## 2023-02-22 NOTE — TELEPHONE ENCOUNTER
It is too early gestation for Pre-e to occur. Likely stress HA or Migraine. Recommend comfort measures. If HA is severe, accompanied by aura can got to Ed for treatment of migraine. Otherwise encourage clinic appointment in next week to discuss. I am curious about whether she has a hx of MARIE or migraines.     Cassidy Hartmann, CNM, APRN CNM

## 2023-03-08 ENCOUNTER — PRENATAL OFFICE VISIT (OUTPATIENT)
Dept: MIDWIFE SERVICES | Facility: CLINIC | Age: 28
End: 2023-03-08
Payer: COMMERCIAL

## 2023-03-08 VITALS
OXYGEN SATURATION: 100 % | HEIGHT: 62 IN | HEART RATE: 94 BPM | BODY MASS INDEX: 37.73 KG/M2 | SYSTOLIC BLOOD PRESSURE: 115 MMHG | DIASTOLIC BLOOD PRESSURE: 75 MMHG | WEIGHT: 205 LBS

## 2023-03-08 DIAGNOSIS — R51.9 PREGNANCY HEADACHE IN SECOND TRIMESTER: ICD-10-CM

## 2023-03-08 DIAGNOSIS — O26.892 PREGNANCY HEADACHE IN SECOND TRIMESTER: ICD-10-CM

## 2023-03-08 DIAGNOSIS — K21.00 GASTROESOPHAGEAL REFLUX DISEASE WITH ESOPHAGITIS WITHOUT HEMORRHAGE: ICD-10-CM

## 2023-03-08 DIAGNOSIS — Z34.02 ENCOUNTER FOR SUPERVISION OF NORMAL FIRST PREGNANCY, SECOND TRIMESTER: Primary | ICD-10-CM

## 2023-03-08 PROCEDURE — 99207 PR PRENATAL VISIT: CPT | Performed by: ADVANCED PRACTICE MIDWIFE

## 2023-03-08 RX ORDER — FAMOTIDINE 20 MG/1
20 TABLET, FILM COATED ORAL 2 TIMES DAILY PRN
Qty: 60 TABLET | Refills: 3 | Status: ON HOLD | OUTPATIENT
Start: 2023-03-08 | End: 2023-08-15

## 2023-03-08 NOTE — PROGRESS NOTES
16w4d  Feeling well. Ordered L2 survey from [facility]. Unable to feel fetal movement yet. Does report heartburn. Rx given for pepcid.  Has lost some weight but believes this is because she started eating healthier and cooking at home. . Denies leaking of fluid, vaginal bleeding, regular uterine contractions, headache or other concerns.  RTC in 4 wks LORETO

## 2023-03-09 ENCOUNTER — TRANSCRIBE ORDERS (OUTPATIENT)
Dept: MATERNAL FETAL MEDICINE | Facility: CLINIC | Age: 28
End: 2023-03-09
Payer: COMMERCIAL

## 2023-03-09 DIAGNOSIS — O26.90 PREGNANCY RELATED CONDITION, ANTEPARTUM: Primary | ICD-10-CM

## 2023-03-10 ENCOUNTER — PRE VISIT (OUTPATIENT)
Dept: MATERNAL FETAL MEDICINE | Facility: CLINIC | Age: 28
End: 2023-03-10
Payer: COMMERCIAL

## 2023-03-20 ENCOUNTER — OFFICE VISIT (OUTPATIENT)
Dept: MATERNAL FETAL MEDICINE | Facility: CLINIC | Age: 28
End: 2023-03-20
Attending: ADVANCED PRACTICE MIDWIFE
Payer: COMMERCIAL

## 2023-03-20 ENCOUNTER — HOSPITAL ENCOUNTER (OUTPATIENT)
Dept: ULTRASOUND IMAGING | Facility: CLINIC | Age: 28
Discharge: HOME | End: 2023-03-20
Attending: ADVANCED PRACTICE MIDWIFE
Payer: COMMERCIAL

## 2023-03-20 DIAGNOSIS — O26.90 PREGNANCY RELATED CONDITION, ANTEPARTUM: ICD-10-CM

## 2023-03-20 DIAGNOSIS — O99.212 MATERNAL OBESITY SYNDROME, ANTEPARTUM, SECOND TRIMESTER: Primary | ICD-10-CM

## 2023-03-20 PROCEDURE — 76811 OB US DETAILED SNGL FETUS: CPT | Mod: 26 | Performed by: OBSTETRICS & GYNECOLOGY

## 2023-03-20 PROCEDURE — 76811 OB US DETAILED SNGL FETUS: CPT

## 2023-03-20 NOTE — NURSING NOTE
Patient presents to [facility] for L2. Denies LOF, vaginal bleeding, cramping/contractions. SBAR given to [facility] TH[facility], see their note in Epic.    Arline Tolbert RN

## 2023-03-20 NOTE — PROGRESS NOTES
Please see full imaging report from [facility] program under imaging tab.    Bryce Parnell MD  Maternal Fetal Medicine

## 2023-04-05 ENCOUNTER — PRENATAL OFFICE VISIT (OUTPATIENT)
Dept: MIDWIFE SERVICES | Facility: CLINIC | Age: 28
End: 2023-04-05
Payer: COMMERCIAL

## 2023-04-05 VITALS
SYSTOLIC BLOOD PRESSURE: 109 MMHG | HEART RATE: 74 BPM | TEMPERATURE: 97.4 F | DIASTOLIC BLOOD PRESSURE: 70 MMHG | WEIGHT: 202.9 LBS | BODY MASS INDEX: 37.11 KG/M2

## 2023-04-05 DIAGNOSIS — Z34.02 ENCOUNTER FOR SUPERVISION OF NORMAL FIRST PREGNANCY, SECOND TRIMESTER: Primary | ICD-10-CM

## 2023-04-05 PROCEDURE — 99207 PR PRENATAL VISIT: CPT | Performed by: ADVANCED PRACTICE MIDWIFE

## 2023-04-05 NOTE — PROGRESS NOTES
20w4d  Sarah is here with Juan today. She is feeling well, just noticing heartburn and occasional low back pain. Discussed comfort measures for back pain and offered pregnancy support belt. Will consider at next appointment. Discussed next option for heartburn is omeprazole. She feels like she has had some relief in last week but will let us know if it returns. She continues to have headaches, has neurology appointment later this month. She has follow up anatomy US coming up in 2 weeks for suboptimal anatomy scan. Has lost 12lb this pregnancy but now is eating and drinking normally. Discussed she doesn't need to catch up by eating more until third trimester, but encouraged to let us know if appetite decreases again. RTC in 4 weeks for GCT, Hgb.    DARRYL Lynne CNM

## 2023-04-18 ENCOUNTER — OFFICE VISIT (OUTPATIENT)
Dept: MATERNAL FETAL MEDICINE | Facility: CLINIC | Age: 28
End: 2023-04-18
Attending: OBSTETRICS & GYNECOLOGY
Payer: COMMERCIAL

## 2023-04-18 ENCOUNTER — HOSPITAL ENCOUNTER (OUTPATIENT)
Dept: ULTRASOUND IMAGING | Facility: CLINIC | Age: 28
Discharge: HOME | End: 2023-04-18
Attending: OBSTETRICS & GYNECOLOGY
Payer: COMMERCIAL

## 2023-04-18 DIAGNOSIS — O99.212 MATERNAL OBESITY SYNDROME, ANTEPARTUM, SECOND TRIMESTER: ICD-10-CM

## 2023-04-18 DIAGNOSIS — O99.212 MATERNAL OBESITY SYNDROME, ANTEPARTUM, SECOND TRIMESTER: Primary | ICD-10-CM

## 2023-04-18 PROCEDURE — 76816 OB US FOLLOW-UP PER FETUS: CPT | Mod: 26 | Performed by: OBSTETRICS & GYNECOLOGY

## 2023-04-18 PROCEDURE — 76816 OB US FOLLOW-UP PER FETUS: CPT

## 2023-04-18 NOTE — PROGRESS NOTES
Please see full imaging report from [facility] program under imaging tab.    Thank-you for referring your patient for an ultrasound to reassess anatomy that was previously suboptimally seen on comprehensive survey.     I discussed the findings on today's ultrasound with the patient and her family. I reviewed the limitations of ultrasound.     Sarah notes that she has lost 20 pound in this pregnancy so far. She has changed to a healthier diet and increased activity.  Given her weight loss and the smaller fetal size, I have recommended growth at 28 and 34 weeks, in addition to weekly BPPs at 37 weeks. We have scheduled her follow up growth with us here.     Return to primary provider for continued prenatal care.    If you have questions regarding today's evaluation or if we can be of further service, please contact the Maternal-Fetal Medicine Center.    Bryce Parnell MD  Maternal Fetal Medicine

## 2023-04-18 NOTE — NURSING NOTE
Patient reports positive fetal movement, no pain, no contractions, leaking of fluid, or bleeding.  SBAR given to [facility] THOMAS, see their note in Epic.

## 2023-05-04 ENCOUNTER — PRENATAL OFFICE VISIT (OUTPATIENT)
Dept: MIDWIFE SERVICES | Facility: CLINIC | Age: 28
End: 2023-05-04
Payer: COMMERCIAL

## 2023-05-04 VITALS
HEART RATE: 76 BPM | WEIGHT: 201.4 LBS | DIASTOLIC BLOOD PRESSURE: 73 MMHG | SYSTOLIC BLOOD PRESSURE: 112 MMHG | BODY MASS INDEX: 36.84 KG/M2

## 2023-05-04 DIAGNOSIS — Z34.02 ENCOUNTER FOR SUPERVISION OF NORMAL FIRST PREGNANCY, SECOND TRIMESTER: Primary | ICD-10-CM

## 2023-05-04 DIAGNOSIS — O99.891 BACK PAIN IN PREGNANCY: ICD-10-CM

## 2023-05-04 DIAGNOSIS — M54.9 BACK PAIN IN PREGNANCY: ICD-10-CM

## 2023-05-04 LAB
GLUCOSE 1H P 50 G GLC PO SERPL-MCNC: 92 MG/DL (ref 70–129)
HGB BLD-MCNC: 11.4 G/DL (ref 11.7–15.7)
HOLD SPECIMEN: NORMAL

## 2023-05-04 PROCEDURE — 82950 GLUCOSE TEST: CPT | Performed by: ADVANCED PRACTICE MIDWIFE

## 2023-05-04 PROCEDURE — 36415 COLL VENOUS BLD VENIPUNCTURE: CPT | Performed by: ADVANCED PRACTICE MIDWIFE

## 2023-05-04 PROCEDURE — 99207 PR PRENATAL VISIT: CPT | Performed by: ADVANCED PRACTICE MIDWIFE

## 2023-05-04 ASSESSMENT — PATIENT HEALTH QUESTIONNAIRE - PHQ9
5. POOR APPETITE OR OVEREATING: NOT AT ALL
SUM OF ALL RESPONSES TO PHQ QUESTIONS 1-9: 1

## 2023-05-04 ASSESSMENT — ANXIETY QUESTIONNAIRES
3. WORRYING TOO MUCH ABOUT DIFFERENT THINGS: NOT AT ALL
2. NOT BEING ABLE TO STOP OR CONTROL WORRYING: NOT AT ALL
5. BEING SO RESTLESS THAT IT IS HARD TO SIT STILL: NOT AT ALL
6. BECOMING EASILY ANNOYED OR IRRITABLE: SEVERAL DAYS
1. FEELING NERVOUS, ANXIOUS, OR ON EDGE: NOT AT ALL
GAD7 TOTAL SCORE: 1
7. FEELING AFRAID AS IF SOMETHING AWFUL MIGHT HAPPEN: NOT AT ALL
GAD7 TOTAL SCORE: 1

## 2023-05-04 NOTE — PROGRESS NOTES
24w5d   Pt here with Juan, overall feeling well, doing GCT and hgb today.  1. C/O back pain, would like to try a pregnancy belt as discussed previously  2. Reviewed last U/S, growth at 19% and pt has had a 13 lb wt loss during pregnancy, follow up growth has been scheduled for 28 and 34 wks  3. 13 lbs wt loss, 1/2 lb in last month, pt states she is feeling well, eating better foods and moving more, denies nausea and vomiting   4. Questions about sensitive and bleeding gums, reviewed bleeding gums can be normal in pregnancy, but would advise a dental check up    RTC 4 wks DARRYL ChatterjeeM

## 2023-06-02 ENCOUNTER — LAB (OUTPATIENT)
Dept: LAB | Facility: CLINIC | Age: 28
End: 2023-06-02
Attending: OBSTETRICS & GYNECOLOGY
Payer: COMMERCIAL

## 2023-06-02 ENCOUNTER — HOSPITAL ENCOUNTER (OUTPATIENT)
Dept: ULTRASOUND IMAGING | Facility: CLINIC | Age: 28
Discharge: HOME | End: 2023-06-02
Attending: OBSTETRICS & GYNECOLOGY
Payer: COMMERCIAL

## 2023-06-02 ENCOUNTER — OFFICE VISIT (OUTPATIENT)
Dept: MATERNAL FETAL MEDICINE | Facility: CLINIC | Age: 28
End: 2023-06-02
Attending: OBSTETRICS & GYNECOLOGY
Payer: COMMERCIAL

## 2023-06-02 ENCOUNTER — MEDICAL CORRESPONDENCE (OUTPATIENT)
Dept: HEALTH INFORMATION MANAGEMENT | Facility: CLINIC | Age: 28
End: 2023-06-02

## 2023-06-02 VITALS — SYSTOLIC BLOOD PRESSURE: 99 MMHG | HEART RATE: 83 BPM | DIASTOLIC BLOOD PRESSURE: 62 MMHG

## 2023-06-02 DIAGNOSIS — O28.3 ABNORMAL PRENATAL ULTRASOUND: ICD-10-CM

## 2023-06-02 DIAGNOSIS — O99.212 MATERNAL OBESITY SYNDROME, ANTEPARTUM, SECOND TRIMESTER: ICD-10-CM

## 2023-06-02 DIAGNOSIS — O28.3 ABNORMAL PRENATAL ULTRASOUND: Primary | ICD-10-CM

## 2023-06-02 DIAGNOSIS — Z36.9 UNSPECIFIED ANTENATAL SCREENING: ICD-10-CM

## 2023-06-02 DIAGNOSIS — O99.210 OBESITY IN PREGNANCY, ANTEPARTUM: ICD-10-CM

## 2023-06-02 DIAGNOSIS — O36.5930 POOR CLINICAL FETAL GROWTH IN THIRD TRIMESTER, SINGLE OR UNSPECIFIED FETUS: Primary | ICD-10-CM

## 2023-06-02 DIAGNOSIS — O36.5930 POOR CLINICAL FETAL GROWTH IN THIRD TRIMESTER, SINGLE OR UNSPECIFIED FETUS: ICD-10-CM

## 2023-06-02 PROCEDURE — 76820 UMBILICAL ARTERY ECHO: CPT | Mod: 26 | Performed by: OBSTETRICS & GYNECOLOGY

## 2023-06-02 PROCEDURE — 59025 FETAL NON-STRESS TEST: CPT | Mod: 26 | Performed by: OBSTETRICS & GYNECOLOGY

## 2023-06-02 PROCEDURE — 36415 COLL VENOUS BLD VENIPUNCTURE: CPT

## 2023-06-02 PROCEDURE — 999N000069 HC STATISTIC GENETIC COUNSELING, < 16 MIN: Mod: TEL,95 | Performed by: GENETIC COUNSELOR, MS

## 2023-06-02 PROCEDURE — 99214 OFFICE O/P EST MOD 30 MIN: CPT | Mod: 25 | Performed by: OBSTETRICS & GYNECOLOGY

## 2023-06-02 PROCEDURE — 76816 OB US FOLLOW-UP PER FETUS: CPT | Mod: 26 | Performed by: OBSTETRICS & GYNECOLOGY

## 2023-06-02 PROCEDURE — 76816 OB US FOLLOW-UP PER FETUS: CPT

## 2023-06-02 NOTE — PROGRESS NOTES
The patient was seen for an ultrasound in the Maternal-Fetal Medicine Center at the [facility] today.  For a detailed report of the ultrasound examination, please see the ultrasound report which can be found under the imaging tab.    If you have questions regarding today's evaluation or if we can be of further service, please contact the Maternal-Fetal Medicine Center.    Radha Alvarez MD  , OB/GYN  Maternal-Fetal Medicine  [phone] (Pager)

## 2023-06-02 NOTE — PROGRESS NOTES
[facility] Fetal [facility]  Genetic Counseling Consult    Patient:  Sarah Churchill YOB: 1995   Date of Service:  6/02/23   MRN: [id number]    Sarah was seen at the [facility] for genetic consultation. The indication for genetic counseling is abnormal fetal ultrasound.     Sarah Churchill was evaluated via a billable telephone visit at [facility] for genetic consultation given newly identified fetal growth restriction.      The patient has been notified of following:    This telephone visit will be conducted via a call between you and your physician/provider. We have found that certain health care needs can be provided without the need for a physical exam. This service lets us provide the care you need with a short phone conversation. If a prescription is necessary we can send it directly to your pharmacy. If lab work is needed we can place an order for that and you can then stop by our lab to have the test done at a later time.     If during the course of the call the provider feels a telephone visit is not appropriate, you will not be charged for this service.    The session was conducted in English.      IMPRESSION/ PLAN   1. Sarah has not had genetic screening in this pregnancy but elected to have screening today.     2. During today's [facility] visit, Sarah had a blood draw for NIPS through [facility]. The NIPS screens for trisomy 21, 18, and 13 and the patient opted to screen for sex chromosome aneuploidies, including reported fetal sex. Results are expected in 7-10 days. The patient will be called with results and if they do not answer they requested a detailed message with results on their voicemail, including the predicted fetal sex information.  Patient was informed that results, including fetal sex, will be available in [facility].    3. Sarah had a growth ultrasound today.  Please see the ultrasound report for further details.    PREGNANCY HISTORY   /Parity:       CURRENT PREGNANCY   Current Age: 27 year old   Age at Delivery: 27 year old  CIRILO: 2023, by Ultrasound                                   Gestational Age: 28w6d  This pregnancy is a single gestation.   This pregnancy was conceived spontaneously.    MEDICAL HISTORY   Sarah s reported medical history is not expected to impact pregnancy management or risks to fetal development.          RISK ASSESSMENT FOR CHROMOSOME CONDITIONS   We explained that the risk for fetal chromosome abnormalities increases with maternal age. We discussed specific features of common chromosome abnormalities, including Down syndrome, trisomy 13, trisomy 18, and sex chromosome trisomies.      At age 27 at midtrimester, the risk to have a baby with Down syndrome is 1 in 1040.    At age 27 at midtrimester, the risk to have a baby with any chromosome abnormality is 1 in 520.     Sarah has not had genetic screening in this pregnancy but elected to have screening today.  We discussed that the newly identified fetal growth restriction can be an indicator of increased risk for fetal chromosome abnormalities and screening or diagnostic testing could be valuable in assessing risks for these conditions.    GENETIC TESTING OPTIONS   Genetic testing during a pregnancy includes screening and diagnostic procedures.      Screening tests are non-invasive which means no risk to the pregnancy and includes ultrasounds and blood work. The benefits and limitations of screening were reviewed. Screening tests provide a risk assessment (chance) specific to the pregnancy for certain fetal chromosome abnormalities but cannot definitively diagnose or exclude a fetal chromosome abnormality. Follow-up genetic counseling and consideration of diagnostic testing is recommended with any abnormal screening result. Diagnostic testing during a pregnancy is more certain  and can test for more conditions. However, the tests do have a risk of miscarriage that requires careful consideration. These tests can detect fetal chromosome abnormalities with greater than 99% certainty. Results can be compromised by maternal cell contamination or mosaicism and are limited by the resolution of current genetic testing technology.     There is no screening or diagnostic test that detects all forms of birth defects or intellectual disability.     We discussed the following screening options:   Non-invasive prenatal testing (NIPT)    Also called cell-free DNA screening because it detects chromosomes from the placenta in the pregnant person's blood    Can be done any time after 10 weeks gestation    Screens for trisomy 21, trisomy 18, trisomy 13, and sex chromosome aneuploidies    Cannot screen for open neural tube defects, maternal serum AFP after 15 weeks is recommended      We discussed the following ultrasound options:  Comprehensive level II ultrasound (Fetal Anatomy Ultrasound)    Ultrasound done between 18-20 weeks gestation    Screens for major birth defects and markers for aneuploidy (like trisomy 21 and trisomy 18)    Includes looking at the fetus/baby's growth, heart, organs (stomach, kidneys), placenta, and amniotic fluid      We discussed the following diagnostic options:   Amniocentesis    Invasive diagnostic procedure done after 15 weeks gestation    The procedure collects a small sample of amniotic fluid for the purpose of chromosomal testing and/or other genetic testing    Diagnostic result; more than 99% sensitivity for fetal chromosome abnormalities    Testing for AFP in the amniotic fluid can test for open neural tube defects        It was a pleasure to be involved with [facility]. Call Duration 11 minutes    Ivan Villar, JENNIFFER, MS, C  Certified and [place] Licensed Genetic Counselor  [facility]  Maternal Fetal Medicine  Office: [phone]  MFM:  [phone]   Fax: [phone]  [facility]

## 2023-06-02 NOTE — NURSING NOTE
Patient presents to [facility] for RL2. Positive fetal movement. Denies LOF, vaginal bleeding or cramping/contractions.     FGR noted on US. NSt performed and reviewed with Dr Alvarez. Plan for weekly sefl/UAR/NST and growth US in 3 weeks. Orders placed. Dr Alvarez discussed POC with patient.    Arline Tolbert RN

## 2023-06-03 ENCOUNTER — HEALTH MAINTENANCE LETTER (OUTPATIENT)
Age: 28
End: 2023-06-03

## 2023-06-07 ENCOUNTER — PRENATAL OFFICE VISIT (OUTPATIENT)
Dept: MIDWIFE SERVICES | Facility: CLINIC | Age: 28
End: 2023-06-07
Payer: COMMERCIAL

## 2023-06-07 VITALS
SYSTOLIC BLOOD PRESSURE: 116 MMHG | HEART RATE: 83 BPM | OXYGEN SATURATION: 98 % | DIASTOLIC BLOOD PRESSURE: 75 MMHG | BODY MASS INDEX: 36.21 KG/M2 | WEIGHT: 198 LBS

## 2023-06-07 DIAGNOSIS — Z34.03 ENCOUNTER FOR SUPERVISION OF NORMAL FIRST PREGNANCY IN THIRD TRIMESTER: Primary | ICD-10-CM

## 2023-06-07 DIAGNOSIS — O36.5990 PREGNANCY AFFECTED BY FETAL GROWTH RESTRICTION: ICD-10-CM

## 2023-06-07 PROCEDURE — 99207 PR PRENATAL VISIT: CPT | Performed by: ADVANCED PRACTICE MIDWIFE

## 2023-06-08 ENCOUNTER — HOSPITAL ENCOUNTER (OUTPATIENT)
Dept: ULTRASOUND IMAGING | Facility: CLINIC | Age: 28
Discharge: HOME | End: 2023-06-08
Attending: OBSTETRICS & GYNECOLOGY
Payer: COMMERCIAL

## 2023-06-08 ENCOUNTER — OFFICE VISIT (OUTPATIENT)
Dept: MATERNAL FETAL MEDICINE | Facility: CLINIC | Age: 28
End: 2023-06-08
Attending: OBSTETRICS & GYNECOLOGY
Payer: COMMERCIAL

## 2023-06-08 DIAGNOSIS — O36.5930 POOR CLINICAL FETAL GROWTH IN THIRD TRIMESTER, SINGLE OR UNSPECIFIED FETUS: ICD-10-CM

## 2023-06-08 DIAGNOSIS — O36.5930 POOR CLINICAL FETAL GROWTH IN THIRD TRIMESTER, SINGLE OR UNSPECIFIED FETUS: Primary | ICD-10-CM

## 2023-06-08 PROBLEM — O36.5990 PREGNANCY AFFECTED BY FETAL GROWTH RESTRICTION: Status: ACTIVE | Noted: 2023-06-08

## 2023-06-08 PROCEDURE — 59025 FETAL NON-STRESS TEST: CPT | Mod: 26 | Performed by: OBSTETRICS & GYNECOLOGY

## 2023-06-08 PROCEDURE — 59025 FETAL NON-STRESS TEST: CPT

## 2023-06-08 PROCEDURE — 76815 OB US LIMITED FETUS(S): CPT

## 2023-06-08 PROCEDURE — 76815 OB US LIMITED FETUS(S): CPT | Mod: 26 | Performed by: OBSTETRICS & GYNECOLOGY

## 2023-06-08 PROCEDURE — 76820 UMBILICAL ARTERY ECHO: CPT | Mod: 26 | Performed by: OBSTETRICS & GYNECOLOGY

## 2023-06-08 NOTE — PROGRESS NOTES
Sarah is here for her PNV.  Reviewed in detail the US results that are showing increasing growth restrictions and increased umbilical cord resistance.  Discussed the timing of delivery would be based on the weekly US results and the amount of resistance in the umbilical cord.    Sarah was concerned that losing wt in pregnancy due to better diet and activity had an effect on this but was reassured this was a placental issue and nothing she could have done or can do to change a placental insufficiency. Only thing she can do is to make all her US appointments and be aware of changes in baby movements/activities and call if any change.  Reviewed how to monitor baby activity.     ASSESSMENT: 29w5d   Growth restriction with umbilical cord flow changes.

## 2023-06-08 NOTE — PROGRESS NOTES
"Please see \"imaging\" tab under \"Chart Review\" for details of today's US at the [facility].    Nirmal Marc MD  Maternal-Fetal Medicine    "

## 2023-06-09 ENCOUNTER — TELEPHONE (OUTPATIENT)
Dept: MATERNAL FETAL MEDICINE | Facility: CLINIC | Age: 28
End: 2023-06-09
Payer: COMMERCIAL

## 2023-06-09 LAB — SCANNED LAB RESULT: NORMAL

## 2023-06-09 NOTE — TELEPHONE ENCOUNTER
6/9/2023       Called Sarah to discuss NIPT results.  Results came back negative for chromosome abnormalities in chromosomes 21, 18, & 13, as well as the sex chromosomes.  These test results do not definitively rule out the possibility of one of these conditions, but they do greatly reduce the likelihood.  The test identified sex chromosomes consistent with female sex (XX) which is consistent with ultrasound.  Sarah had no questions at this time and was encouraged to reach out if she has any questions or concerns in the future.       Ivan Villar MS, [facility]  Licensed Genetic Counselor  Phone: [phone]  Pager: [phone]

## 2023-06-10 ENCOUNTER — TELEPHONE (OUTPATIENT)
Dept: MIDWIFE SERVICES | Facility: CLINIC | Age: 28
End: 2023-06-10
Payer: COMMERCIAL

## 2023-06-11 NOTE — TELEPHONE ENCOUNTER
Telephone call to Sarah after she called FNA to report numbness in right hand/wrist. She is having numbness in her right thumb that is spreading to the palm of her hand. Denies any other symptoms, including feeling short of breath, pain, headache. Discussed that it is likely carpel tunnel, but if anything changes, gets worse, or other symptoms arise, she should call back.    Luis Mejía CNM

## 2023-06-15 ENCOUNTER — HOSPITAL ENCOUNTER (OUTPATIENT)
Dept: ULTRASOUND IMAGING | Facility: CLINIC | Age: 28
Discharge: HOME | End: 2023-06-15
Attending: OBSTETRICS & GYNECOLOGY
Payer: COMMERCIAL

## 2023-06-15 ENCOUNTER — OFFICE VISIT (OUTPATIENT)
Dept: MATERNAL FETAL MEDICINE | Facility: CLINIC | Age: 28
End: 2023-06-15
Attending: OBSTETRICS & GYNECOLOGY
Payer: COMMERCIAL

## 2023-06-15 DIAGNOSIS — O36.5930 POOR CLINICAL FETAL GROWTH IN THIRD TRIMESTER, SINGLE OR UNSPECIFIED FETUS: ICD-10-CM

## 2023-06-15 DIAGNOSIS — O36.5930 POOR CLINICAL FETAL GROWTH IN THIRD TRIMESTER, SINGLE OR UNSPECIFIED FETUS: Primary | ICD-10-CM

## 2023-06-15 PROCEDURE — 76820 UMBILICAL ARTERY ECHO: CPT | Mod: 26 | Performed by: OBSTETRICS & GYNECOLOGY

## 2023-06-15 PROCEDURE — 59025 FETAL NON-STRESS TEST: CPT

## 2023-06-15 PROCEDURE — 59025 FETAL NON-STRESS TEST: CPT | Mod: 26 | Performed by: OBSTETRICS & GYNECOLOGY

## 2023-06-15 PROCEDURE — 76820 UMBILICAL ARTERY ECHO: CPT

## 2023-06-15 PROCEDURE — 76815 OB US LIMITED FETUS(S): CPT | Mod: 26 | Performed by: OBSTETRICS & GYNECOLOGY

## 2023-06-15 NOTE — PROGRESS NOTES
"Please see \"imaging\" tab under \"Chart Review\" for details of today's US at the [facility].    Nirmal Marc MD  Maternal-Fetal Medicine    "

## 2023-06-15 NOTE — NURSING NOTE
NST Performed due to FGR.   reviewed efm tracing. Positive fetal movement. Denies LOF, vaginal bleeding or contractions. See NST/BPP Doc Flowsheet tab.

## 2023-06-21 ENCOUNTER — PRENATAL OFFICE VISIT (OUTPATIENT)
Dept: MIDWIFE SERVICES | Facility: CLINIC | Age: 28
End: 2023-06-21
Payer: COMMERCIAL

## 2023-06-21 VITALS
DIASTOLIC BLOOD PRESSURE: 66 MMHG | OXYGEN SATURATION: 98 % | HEART RATE: 78 BPM | WEIGHT: 198 LBS | BODY MASS INDEX: 36.21 KG/M2 | SYSTOLIC BLOOD PRESSURE: 93 MMHG

## 2023-06-21 DIAGNOSIS — Z34.03 ENCOUNTER FOR SUPERVISION OF NORMAL FIRST PREGNANCY IN THIRD TRIMESTER: Primary | ICD-10-CM

## 2023-06-21 PROCEDURE — 99207 PR PRENATAL VISIT: CPT | Performed by: ADVANCED PRACTICE MIDWIFE

## 2023-06-21 NOTE — PATIENT INSTRUCTIONS
Birth Control Methods  Birth control methods are used to help prevent pregnancy. There are many different methods to choose from. Talk with your healthcare provider about which method is right for you. Be sure to ask your provider how well each one works. Also ask about the benefits, risks, and side effects of each method.   Hormones  Some birth control methods work by releasing hormones such as progestin and estrogen. These methods include hormone implants, hormone shots, the vaginal ring, the patch, and birth control pills. They all work by stopping the release of the egg from the ovary (ovulation). All of these methods work well and can be stopped at any time.     The implant is a small device that needs to be placed in the upper arm by a trained healthcare provider. It works for up to 3 years.    Hormone injections must be repeated every 3 months.    The vaginal ring must be replaced monthly. It can be removed during the fourth week of each cycle.    The patch must be replaced weekly. It's not worn during the fourth week of each cycle.    Birth control pills must be taken every day.  Intrauterine device (IUD)  An IUD is a small, T-shaped device. It must be placed in the uterus by a trained healthcare provider. There are different types of IUDs available. They work by causing changes in the uterus that make it harder for sperm to reach the egg. Depending on the type of IUD you have, it may work for several years or longer. The IUD is a reversible birth control method. This means it can be removed at any time.   Condom  A condom is a sheath that forms a thin barrier between the penis and the vagina. It helps prevent pregnancy by keeping sperm from entering the vagina. When latex condoms are used, they have the added benefit of protecting against most STIs (sexually transmitted infections). Condoms are used each time there is sexual intercourse and should be discarded after each use. Ask your healthcare  provider about the different types of condoms available. These include both the male condom and female condom.   Spermicide  Spermicides come as foams, jellies, creams, suppositories, and tablets.  They help prevent pregnancy by killing sperm. When used alone they are not that reliable. They work best when combined with other birth control methods such as diaphragms and cervical caps.   Sponge, diaphragm, and cervical cap   All of these methods help prevent pregnancy by covering the opening of the uterus (cervix). This prevents sperm from passing through.   The sponge contains spermicide. It can be bought over the counter. The sponge must be left in place for at least 6 hours after the last time you have sex. However, it should not stay in place for more than 24 hours. Discard after use.   The diaphragm and cervical cap must be fitted and prescribed by your healthcare provider. Both are used with spermicide. The diaphragm must be left in place for at least 6 hours after sex. However, it should not stay in place for more than 24 hours. It can be washed and reused. The cervical cap must be left in place for at least 6 hours after sex. However, it should not stay in place for more than 48 hours. It can be washed and reused.   Withdrawal method  This is when the man pulls his penis out of the vagina just before ejaculation ( coming ). This lowers the amount of sperm entering the vagina. Be aware that fluids released just before ejaculation often still contain some sperm, so this method is not as reliable as certain other methods.   Rhythm method   This method is also call natural family planning or fertility awareness. It requires that you know when in your menstrual cycle you are likely to become pregnant. Then you not have sex during those days. This requires careful planning and good discipline. Your healthcare provider can explain more about how this works.   Tubal ligation and vasectomy  These are surgical methods  to prevent pregnancy. Tubal ligation is an option for women. The fallopian tubes are blocked or cut (ligated). This keeps the egg from passing into the uterus or sperm from reaching the egg. Vasectomy is an option for men. The tubes that normally carry sperm to the penis are either closed or blocked. Both tubal ligation and vasectomy are permanent birth control methods. This means reversal is either not possible or unlikely to work. They are good choices for women and men who know that they don't want to have children in the future.   [employer] last reviewed this educational content on 2020-2022 [employer]. All rights reserved. This information is not intended as a substitute for professional medical care. Always follow your healthcare professional's instructions.          Understanding  Labor  Going into labor before week 37 of pregnancy is called  labor.  labor can cause your baby to be born too soon. This can lead to health problems for your baby.     Before labor, the cervix is thick and closed.      In  labor, the cervix begins to efface (thin) and dilate (open).     Symptoms of  labor  If you think you re having  labor, get medical help right away. Contractions alone don t mean you re in  labor. What matters more are changes in your cervix. The cervix is the opening at the lower end of the uterus. Symptoms of  labor include:    4 or more contractions per hour    Strong contractions    Constant menstrual-like cramping    Low-back pain    Mucous or bloody fluid from the vagina    Bleeding or spotting in the second or third trimester  Evaluating  labor  Your healthcare provider will try to find out if you re in  labor or just having contractions. They may watch you for a few hours. You may have these tests:    Pelvic exam. This is to see if your cervix has effaced (thinned) and dilated (opened).    Uterine activity  monitoring. This is used to detect contractions.    Fetal monitoring. This is done to check the health of your baby.    Ultrasound. This test looks at your baby s size and position.    Amniocentesis. This test checks how mature your baby s lungs are.  Caring for yourself at home  If you have  contractions, but your cervix is still thick and closed, your healthcare provider may tell you to:    Drink plenty of water.    Do fewer activities.    Rest in bed on your side.    Don't have intercourse or stimulate your nipples.  When to call your healthcare provider  Call your healthcare provider if you have any of these:    4 or more contractions per hour    Bag of water breaks    Bleeding or spotting  If you need hospital care   labor often means that you need hospital care. You may need complete bed rest. You may have an IV (intravenous) line in your arm or hand. This is to give you fluids. You may be given pills or injections. These are done to help prevent contractions. You may get a medicine called a corticosteroid. This is to help your baby s lungs mature more quickly.  Are you at risk?  Any pregnant woman can have  labor. It may start for no reason. But these risk factors can increase your chances:    Past  labor or early birth    Smoking, drug, or alcohol use in pregnancy    A multiple pregnancy (twins or more)    Problems with the shape of the uterus    Bleeding during the pregnancy  The dangers of  birth  A baby born too soon may have health problems. This is because the baby didn t have enough time to grow. Some of the risks for your baby include:    Not breastfeeding or feeding well    Having immature lungs    Bleeding in the brain    Death  Reaching term  Your goal is to get as close to term (week 37 or later) as you can before giving birth. The closer you get to term, the higher your chance of having a healthy baby. Work with your healthcare provider. Together, you can take  steps that may keep you from giving birth too early.  [employer] last reviewed this educational content on 10/1/2021    0720-9811 [employer]. All rights reserved. This information is not intended as a substitute for professional medical care. Always follow your healthcare professional's instructions.          Adapting to Pregnancy: Third Trimester  Although common during pregnancy, some discomforts may seem worse in the final weeks. Simple lifestyle changes can help. Take care of yourself. And ask your partner to help out with small tasks.   Limiting leg problems  Ways to combat leg issues:    Wear support hose all day.    Don't wear snug shoes or clothes that bind, such as tight pants and socks with elastic tops.    Sit with your feet and legs raised often.  Caring for your breasts  Tips to follow include:    Wash with plain water. Don't use harsh soaps or rubbing alcohol. They may cause dryness.    Wear a nursing bra for extra support. It can also hide any leaks from your nipples.  Controlling hemorrhoids  Ways to prevent hemorrhoids include:     Eat foods that are high in fiber. Also exercise and drink enough fluids. This will reduce constipation and hemorrhoids.    Sleep and nap on your side. This limits pressure on the veins of your rectum.    Try not to stand or sit for long periods.  Controlling back pain  As your body changes during pregnancy, your back must work in new ways. Back pain has many causes. Physical changes in your body can strain your back and its supporting muscles. Also hormones increase during pregnancy. This can affect how your muscles and joints work together. All of these changes can lead to pain.   Pain may be felt in the upper or lower back. Pain is also common in the pelvis. Some pregnant women have sciatica. This is pain caused by pressure on the sciatic nerve running down the back of the leg. Ice or heat may help. Your provider may advise massage therapy or a  chiropractor. Sleep on your left side with a pillow between your knees. Use a brace or support device. Ask your provider for specific tips and exercises to help control your back pain.   Tips to help you rest  Good rest and sleep will help you feel better. Here are some ideas:     Ask your partner to massage your shoulders, neck, or back.    Limit the errands you do each day.    Lie down in the afternoon or after work for a few minutes.    Take a warm bath before you go to sleep.    Drink warm milk or teas without caffeine.    Don't drink coffee, black tea, and cola.  Stopping heartburn    Don't eat spicy, greasy, fried, or acidic foods.    Eat small amounts more often. Eat slowly.   Wait 2 hours after eating before lying down.    Sleep with your upper body raised 6 inches.   Managing mood swings  Ways to manage mood swings include:     Know that mood changes are normal.    Exercise often, but get plenty of rest.    Address any concerns and limit stress. Talking to your partner, other women, or your healthcare provider may help.   Dealing with urinary frequency  Tips to deal with having to urinate often include:     Drink plenty of water all day. But if you drink a lot in the evening, you may have to get up more in the night.    Limit coffee, black tea, and cola.  How daily issues affect your health  Many things in your daily life impact your health. This can include transportation, money problems, housing, access to food, and . If you can t get to medical appointments, you may not receive the care you need. When money is tight, it may be difficult to pay for medicines. And living far from a grocery store can make it hard to buy healthy food.   If you have concerns in any of these or other areas, talk with your healthcare team. They may know of local resources to assist you. Or they may have a staff person who can help.   [employer] last reviewed this educational content on 7/1/2021 2000-2022 [employer]. All rights reserved. This information is not intended as a substitute for professional medical care. Always follow your healthcare professional's instructions.        You have been provided the Any Day Now: Early Labor at Home document.    Additional copies can be found here:  [email or web address]  You have been provided the What I'd Wish I'd Known About Giving Birth document.    Additional copies can be found here:  [email or web address]

## 2023-06-21 NOTE — PROGRESS NOTES
31w4d  Sarah is feeling well. Baby is active, denies bleeding, leaking of fluid. Still having headaches, did not get to neurology consult. VINCENT is following for FGR, has next US on Fri. Discussed emergency signs/symptoms to call CNM team about. Plan for every 2 week visits now until 36w.  Luis Mejía CNM

## 2023-06-22 ENCOUNTER — OFFICE VISIT (OUTPATIENT)
Dept: MATERNAL FETAL MEDICINE | Facility: CLINIC | Age: 28
End: 2023-06-22
Attending: OBSTETRICS & GYNECOLOGY
Payer: COMMERCIAL

## 2023-06-22 ENCOUNTER — HOSPITAL ENCOUNTER (OUTPATIENT)
Dept: ULTRASOUND IMAGING | Facility: CLINIC | Age: 28
Discharge: HOME | End: 2023-06-22
Attending: OBSTETRICS & GYNECOLOGY
Payer: COMMERCIAL

## 2023-06-22 DIAGNOSIS — O36.5930 POOR CLINICAL FETAL GROWTH IN THIRD TRIMESTER, SINGLE OR UNSPECIFIED FETUS: Primary | ICD-10-CM

## 2023-06-22 DIAGNOSIS — O36.5930 POOR CLINICAL FETAL GROWTH IN THIRD TRIMESTER, SINGLE OR UNSPECIFIED FETUS: ICD-10-CM

## 2023-06-22 PROCEDURE — 59025 FETAL NON-STRESS TEST: CPT

## 2023-06-22 PROCEDURE — 76816 OB US FOLLOW-UP PER FETUS: CPT

## 2023-06-22 PROCEDURE — 76816 OB US FOLLOW-UP PER FETUS: CPT | Mod: 26 | Performed by: OBSTETRICS & GYNECOLOGY

## 2023-06-22 PROCEDURE — 76820 UMBILICAL ARTERY ECHO: CPT | Mod: 26 | Performed by: OBSTETRICS & GYNECOLOGY

## 2023-06-22 NOTE — PROGRESS NOTES
The patient was seen for an ultrasound in the Maternal-Fetal Medicine Center at the [facility] today.  For a detailed report of the ultrasound examination, please see the ultrasound report which can be found under the imaging tab.    If you have questions regarding today's evaluation or if we can be of further service, please contact the Maternal-Fetal Medicine Center.    Radha Alvarez MD  , OB/GYN  Maternal-Fetal Medicine  [phone] (Pager)

## 2023-06-22 NOTE — NURSING NOTE
Patient presents to M for RL2/UAR/NST. Positive fetal movement. Denies LOF, vaginal bleeding or cramping/contractions. NST performed due to FGR. SBAR given to M MD, see their note in Epic.

## 2023-06-29 ENCOUNTER — HOSPITAL ENCOUNTER (OUTPATIENT)
Dept: ULTRASOUND IMAGING | Facility: CLINIC | Age: 28
Discharge: HOME | End: 2023-06-29
Attending: OBSTETRICS & GYNECOLOGY
Payer: COMMERCIAL

## 2023-06-29 ENCOUNTER — OFFICE VISIT (OUTPATIENT)
Dept: MATERNAL FETAL MEDICINE | Facility: CLINIC | Age: 28
End: 2023-06-29
Attending: OBSTETRICS & GYNECOLOGY
Payer: COMMERCIAL

## 2023-06-29 DIAGNOSIS — O36.5930 POOR CLINICAL FETAL GROWTH IN THIRD TRIMESTER, SINGLE OR UNSPECIFIED FETUS: Primary | ICD-10-CM

## 2023-06-29 DIAGNOSIS — O36.5930 POOR CLINICAL FETAL GROWTH IN THIRD TRIMESTER, SINGLE OR UNSPECIFIED FETUS: ICD-10-CM

## 2023-06-29 PROCEDURE — 59025 FETAL NON-STRESS TEST: CPT | Mod: 26 | Performed by: OBSTETRICS & GYNECOLOGY

## 2023-06-29 PROCEDURE — 76815 OB US LIMITED FETUS(S): CPT

## 2023-06-29 PROCEDURE — 59025 FETAL NON-STRESS TEST: CPT

## 2023-06-29 PROCEDURE — 76815 OB US LIMITED FETUS(S): CPT | Mod: 26 | Performed by: OBSTETRICS & GYNECOLOGY

## 2023-06-29 PROCEDURE — 76820 UMBILICAL ARTERY ECHO: CPT | Mod: 26 | Performed by: OBSTETRICS & GYNECOLOGY

## 2023-06-29 NOTE — NURSING NOTE
Patient reports positive fetal movement, no pain, no contractions, leaking of fluid, or bleeding. NST Performed due to FGR.  Dr. Ratlfif reviewed efm tracing. See NST/BPP Doc Flowsheet tab.

## 2023-06-29 NOTE — PROGRESS NOTES
"Please see \"Imaging\" tab under \"Chart Review\" for details of today's visit.    Eulalio Ratliff    "

## 2023-07-06 ENCOUNTER — HOSPITAL ENCOUNTER (OUTPATIENT)
Dept: ULTRASOUND IMAGING | Facility: CLINIC | Age: 28
Discharge: HOME | End: 2023-07-06
Attending: OBSTETRICS & GYNECOLOGY
Payer: COMMERCIAL

## 2023-07-06 ENCOUNTER — OFFICE VISIT (OUTPATIENT)
Dept: MATERNAL FETAL MEDICINE | Facility: CLINIC | Age: 28
End: 2023-07-06
Attending: OBSTETRICS & GYNECOLOGY
Payer: COMMERCIAL

## 2023-07-06 DIAGNOSIS — O36.5930 POOR CLINICAL FETAL GROWTH IN THIRD TRIMESTER, SINGLE OR UNSPECIFIED FETUS: ICD-10-CM

## 2023-07-06 DIAGNOSIS — O36.5930 POOR CLINICAL FETAL GROWTH IN THIRD TRIMESTER, SINGLE OR UNSPECIFIED FETUS: Primary | ICD-10-CM

## 2023-07-06 PROCEDURE — 76820 UMBILICAL ARTERY ECHO: CPT

## 2023-07-06 PROCEDURE — 59025 FETAL NON-STRESS TEST: CPT

## 2023-07-06 PROCEDURE — 59025 FETAL NON-STRESS TEST: CPT | Mod: 26 | Performed by: OBSTETRICS & GYNECOLOGY

## 2023-07-06 PROCEDURE — 76820 UMBILICAL ARTERY ECHO: CPT | Mod: 26 | Performed by: OBSTETRICS & GYNECOLOGY

## 2023-07-06 PROCEDURE — 76815 OB US LIMITED FETUS(S): CPT | Mod: 26 | Performed by: OBSTETRICS & GYNECOLOGY

## 2023-07-06 NOTE — PROGRESS NOTES
The patient was seen for an ultrasound in the Maternal-Fetal Medicine Center at the [facility] today.  For a detailed report of the ultrasound examination, please see the ultrasound report which can be found under the imaging tab.    If you have questions regarding today's evaluation or if we can be of further service, please contact the Maternal-Fetal Medicine Center.    Radha Alvarez MD  , OB/GYN  Maternal-Fetal Medicine  [phone] (Pager)

## 2023-07-06 NOTE — NURSING NOTE
NST Performed due to FGR.   reviewed efm tracing. See NST/BPP Doc Flowsheet tab. Positive fetal movement. Denies LOF or vaginal bleeding.    Arline Tolbert RN

## 2023-07-07 ENCOUNTER — PRENATAL OFFICE VISIT (OUTPATIENT)
Dept: MIDWIFE SERVICES | Facility: CLINIC | Age: 28
End: 2023-07-07
Payer: COMMERCIAL

## 2023-07-07 VITALS
WEIGHT: 199 LBS | HEART RATE: 82 BPM | OXYGEN SATURATION: 100 % | BODY MASS INDEX: 36.4 KG/M2 | SYSTOLIC BLOOD PRESSURE: 92 MMHG | DIASTOLIC BLOOD PRESSURE: 58 MMHG

## 2023-07-07 DIAGNOSIS — O09.529 SUPERVISION OF HIGH-RISK PREGNANCY OF ELDERLY MULTIGRAVIDA: ICD-10-CM

## 2023-07-07 PROCEDURE — 99207 PR PRENATAL VISIT: CPT | Performed by: ADVANCED PRACTICE MIDWIFE

## 2023-07-07 NOTE — PROGRESS NOTES
33w6d  Patient is feeling well. Understands plan of care and why there is increased surveillance in her pregnancy. Comprehensive MFM visit next week for FGR and increased dopplers. MFM report discusses iol at 37w for increased dopplers and patient understands this recommendation will be confirmed at next weeks appointment. Warning signs discussed and patient has no other concerns.   Next visit in 2 weeks.     Karmen ANDREWS, REMIM, MPH

## 2023-07-11 ENCOUNTER — DOCUMENTATION ONLY (OUTPATIENT)
Dept: MIDWIFE SERVICES | Facility: CLINIC | Age: 28
End: 2023-07-11
Payer: COMMERCIAL

## 2023-07-11 DIAGNOSIS — M54.50 ACUTE BILATERAL LOW BACK PAIN WITHOUT SCIATICA: Primary | ICD-10-CM

## 2023-07-13 ENCOUNTER — OFFICE VISIT (OUTPATIENT)
Dept: MATERNAL FETAL MEDICINE | Facility: CLINIC | Age: 28
End: 2023-07-13
Attending: OBSTETRICS & GYNECOLOGY
Payer: COMMERCIAL

## 2023-07-13 ENCOUNTER — HOSPITAL ENCOUNTER (OUTPATIENT)
Dept: ULTRASOUND IMAGING | Facility: CLINIC | Age: 28
Discharge: HOME | End: 2023-07-13
Attending: OBSTETRICS & GYNECOLOGY
Payer: COMMERCIAL

## 2023-07-13 DIAGNOSIS — O36.5930 MATERNAL CARE FOR OTHER KNOWN OR SUSPECTED POOR FETAL GROWTH, THIRD TRIMESTER, NOT APPLICABLE OR UNSPECIFIED: Primary | ICD-10-CM

## 2023-07-13 DIAGNOSIS — O99.212 MATERNAL OBESITY SYNDROME, ANTEPARTUM, SECOND TRIMESTER: ICD-10-CM

## 2023-07-13 PROCEDURE — 99213 OFFICE O/P EST LOW 20 MIN: CPT | Mod: 25 | Performed by: OBSTETRICS & GYNECOLOGY

## 2023-07-13 PROCEDURE — 76816 OB US FOLLOW-UP PER FETUS: CPT | Mod: 26 | Performed by: OBSTETRICS & GYNECOLOGY

## 2023-07-13 PROCEDURE — 76816 OB US FOLLOW-UP PER FETUS: CPT

## 2023-07-13 NOTE — NURSING NOTE
Patient presents to [facility] for RL2/UAR at 34+5 weeks due to fetal growth restriction. Positive fetal movement. Denies LOF, vaginal bleeding or cramping/contractions. SBAR given to VINCENT THOMAS, see their note in Epic. US today showing normal growth. Will follow up with M in 2 weeks to reevaluate fetal growth again.

## 2023-07-21 ENCOUNTER — PRENATAL OFFICE VISIT (OUTPATIENT)
Dept: MIDWIFE SERVICES | Facility: CLINIC | Age: 28
End: 2023-07-21
Payer: COMMERCIAL

## 2023-07-21 VITALS
BODY MASS INDEX: 36.62 KG/M2 | OXYGEN SATURATION: 98 % | WEIGHT: 200.2 LBS | SYSTOLIC BLOOD PRESSURE: 110 MMHG | HEART RATE: 77 BPM | DIASTOLIC BLOOD PRESSURE: 68 MMHG

## 2023-07-21 DIAGNOSIS — Z34.03 ENCOUNTER FOR SUPERVISION OF NORMAL FIRST PREGNANCY IN THIRD TRIMESTER: Primary | ICD-10-CM

## 2023-07-21 LAB
HGB BLD-MCNC: 11.4 G/DL (ref 11.7–15.7)
HOLD SPECIMEN: NORMAL

## 2023-07-21 PROCEDURE — 99207 PR PRENATAL VISIT: CPT | Performed by: ADVANCED PRACTICE MIDWIFE

## 2023-07-21 PROCEDURE — 36415 COLL VENOUS BLD VENIPUNCTURE: CPT | Performed by: ADVANCED PRACTICE MIDWIFE

## 2023-07-21 PROCEDURE — 87653 STREP B DNA AMP PROBE: CPT | Performed by: ADVANCED PRACTICE MIDWIFE

## 2023-07-21 NOTE — PROGRESS NOTES
35w6d  Feeling well mostly - has noticed restless legs, pelvic pressure/discomfort. Baby is active. No regular contractions, LOF or bleeding. Discussed comfort measures, recommend Magnesium supplement or foot spray. Last M US growth was WNL, follow up planned for 7/27. GBS, hgb today. RTC in 1 week. MML

## 2023-07-22 LAB — GP B STREP DNA SPEC QL NAA+PROBE: NEGATIVE

## 2023-07-27 ENCOUNTER — HOSPITAL ENCOUNTER (OUTPATIENT)
Dept: ULTRASOUND IMAGING | Facility: CLINIC | Age: 28
Discharge: HOME | End: 2023-07-27
Attending: OBSTETRICS & GYNECOLOGY
Payer: COMMERCIAL

## 2023-07-27 ENCOUNTER — OFFICE VISIT (OUTPATIENT)
Dept: MATERNAL FETAL MEDICINE | Facility: CLINIC | Age: 28
End: 2023-07-27
Attending: OBSTETRICS & GYNECOLOGY
Payer: COMMERCIAL

## 2023-07-27 DIAGNOSIS — O36.5930 MATERNAL CARE FOR OTHER KNOWN OR SUSPECTED POOR FETAL GROWTH, THIRD TRIMESTER, NOT APPLICABLE OR UNSPECIFIED: ICD-10-CM

## 2023-07-27 DIAGNOSIS — O99.210 OBESITY DURING PREGNANCY: ICD-10-CM

## 2023-07-27 DIAGNOSIS — O36.5930 MATERNAL CARE FOR OTHER KNOWN OR SUSPECTED POOR FETAL GROWTH, THIRD TRIMESTER, NOT APPLICABLE OR UNSPECIFIED: Primary | ICD-10-CM

## 2023-07-27 PROCEDURE — 76816 OB US FOLLOW-UP PER FETUS: CPT

## 2023-07-27 PROCEDURE — 76820 UMBILICAL ARTERY ECHO: CPT | Mod: 26 | Performed by: OBSTETRICS & GYNECOLOGY

## 2023-07-27 PROCEDURE — 99213 OFFICE O/P EST LOW 20 MIN: CPT | Mod: 25 | Performed by: OBSTETRICS & GYNECOLOGY

## 2023-07-27 PROCEDURE — 76816 OB US FOLLOW-UP PER FETUS: CPT | Mod: 26 | Performed by: OBSTETRICS & GYNECOLOGY

## 2023-07-28 ENCOUNTER — PRENATAL OFFICE VISIT (OUTPATIENT)
Dept: MIDWIFE SERVICES | Facility: CLINIC | Age: 28
End: 2023-07-28
Payer: COMMERCIAL

## 2023-07-28 VITALS
HEART RATE: 84 BPM | DIASTOLIC BLOOD PRESSURE: 77 MMHG | WEIGHT: 199 LBS | TEMPERATURE: 97.8 F | BODY MASS INDEX: 36.4 KG/M2 | SYSTOLIC BLOOD PRESSURE: 111 MMHG

## 2023-07-28 DIAGNOSIS — Z34.03 ENCOUNTER FOR SUPERVISION OF NORMAL FIRST PREGNANCY IN THIRD TRIMESTER: Primary | ICD-10-CM

## 2023-07-28 PROCEDURE — 99207 PR PRENATAL VISIT: CPT | Performed by: ADVANCED PRACTICE MIDWIFE

## 2023-07-28 NOTE — PROGRESS NOTES
"36w6d  Sarah here today for a return prenatal visit. She has been followed by [facility] due to her baby being growth restricted. Per their recommendations yesterday, IOL at 39 weeks.     Per Dr. Ratliff's report:  \"We reviewed that the baby today measures at the 10th percentile for GA. Given this, a repeat assessment of fetal growth has been scheduled here in 2 weeks and I would  recommend scheduling IOL at 39w0d given BMI 39 and history FGR in this pregnancy.  surveillance with weekly BPP will begin at 37 weeks due to BMI > 35.\"    IOL scheduled for  at 10am at the birthplace. Patient instructed to call birthplace at 9am to ensure that she has a RN and a bed.   Sarah is otherwise feeling well. Endorses postive fetal movement. Denies LOF, bleeding, regular contractions, headaches, or visual changes. Having occasional cramping that goes away with rest. Still having restless legs, will try magnesium soon, has not tried it yet. RTC in one week for CNM visit. US scheduled at [facility].     Maddi FRANCOIS    I was present with the CNM student who participated in the service and in the documentation of the services provided. I have verified the history and personally performed the physical exam and medical decision making, as documented by the student and edited by me. DARRYL Davis CNM     "

## 2023-08-03 ENCOUNTER — OFFICE VISIT (OUTPATIENT)
Dept: MATERNAL FETAL MEDICINE | Facility: CLINIC | Age: 28
End: 2023-08-03
Attending: OBSTETRICS & GYNECOLOGY
Payer: COMMERCIAL

## 2023-08-03 ENCOUNTER — HOSPITAL ENCOUNTER (OUTPATIENT)
Dept: ULTRASOUND IMAGING | Facility: CLINIC | Age: 28
Discharge: HOME | End: 2023-08-03
Attending: OBSTETRICS & GYNECOLOGY
Payer: COMMERCIAL

## 2023-08-03 DIAGNOSIS — O36.5930 MATERNAL CARE FOR OTHER KNOWN OR SUSPECTED POOR FETAL GROWTH, THIRD TRIMESTER, NOT APPLICABLE OR UNSPECIFIED: ICD-10-CM

## 2023-08-03 DIAGNOSIS — O99.210 OBESITY DURING PREGNANCY: Primary | ICD-10-CM

## 2023-08-03 PROCEDURE — 76819 FETAL BIOPHYS PROFIL W/O NST: CPT

## 2023-08-03 PROCEDURE — 76819 FETAL BIOPHYS PROFIL W/O NST: CPT | Mod: 26 | Performed by: OBSTETRICS & GYNECOLOGY

## 2023-08-03 NOTE — NURSING NOTE
Patient presents to MFM for BPP. Positive fetal movement. Denies LOF, vaginal bleeding or cramping/contractions. SBAR given to MFM MD, see their note in Epic.

## 2023-08-03 NOTE — PROGRESS NOTES
The patient was seen for an ultrasound in the Maternal-Fetal Medicine Center at the [facility] today.  For a detailed report of the ultrasound examination, please see the ultrasound report which can be found under the imaging tab.    If you have questions regarding today's evaluation or if we can be of further service, please contact the Maternal-Fetal Medicine Center.    Radha Alvarez MD  , OB/GYN  Maternal-Fetal Medicine  [phone] (Pager)

## 2023-08-04 ENCOUNTER — PRENATAL OFFICE VISIT (OUTPATIENT)
Dept: MIDWIFE SERVICES | Facility: CLINIC | Age: 28
End: 2023-08-04
Payer: COMMERCIAL

## 2023-08-04 VITALS
DIASTOLIC BLOOD PRESSURE: 70 MMHG | TEMPERATURE: 97.8 F | WEIGHT: 199.4 LBS | HEART RATE: 82 BPM | SYSTOLIC BLOOD PRESSURE: 99 MMHG | BODY MASS INDEX: 36.47 KG/M2 | OXYGEN SATURATION: 99 %

## 2023-08-04 DIAGNOSIS — Z34.03 ENCOUNTER FOR SUPERVISION OF NORMAL FIRST PREGNANCY IN THIRD TRIMESTER: Primary | ICD-10-CM

## 2023-08-04 PROCEDURE — 99207 PR PRENATAL VISIT: CPT | Performed by: ADVANCED PRACTICE MIDWIFE

## 2023-08-12 ENCOUNTER — HOSPITAL ENCOUNTER (INPATIENT)
Facility: CLINIC | Age: 28
LOS: 3 days | Discharge: HOME HEALTH | End: 2023-08-15
Attending: ADVANCED PRACTICE MIDWIFE | Admitting: ADVANCED PRACTICE MIDWIFE
Payer: COMMERCIAL

## 2023-08-12 PROBLEM — Z34.90 ENCOUNTER FOR INDUCTION OF LABOR: Status: ACTIVE | Noted: 2023-08-12

## 2023-08-12 LAB
ABO/RH(D): NORMAL
ANTIBODY SCREEN: NEGATIVE
ERYTHROCYTE [DISTWIDTH] IN BLOOD BY AUTOMATED COUNT: 13.1 % (ref 10–15)
HCT VFR BLD AUTO: 34.4 % (ref 35–47)
HGB BLD-MCNC: 11.9 G/DL (ref 11.7–15.7)
MCH RBC QN AUTO: 31.6 PG (ref 26.5–33)
MCHC RBC AUTO-ENTMCNC: 34.6 G/DL (ref 31.5–36.5)
MCV RBC AUTO: 91 FL (ref 78–100)
PLATELET # BLD AUTO: 216 10E3/UL (ref 150–450)
RBC # BLD AUTO: 3.77 10E6/UL (ref 3.8–5.2)
SARS-COV-2 RNA RESP QL NAA+PROBE: NEGATIVE
SPECIMEN EXPIRATION DATE: NORMAL
T PALLIDUM AB SER QL: NONREACTIVE
WBC # BLD AUTO: 7.4 10E3/UL (ref 4–11)

## 2023-08-12 PROCEDURE — 250N000013 HC RX MED GY IP 250 OP 250 PS 637: Performed by: ADVANCED PRACTICE MIDWIFE

## 2023-08-12 PROCEDURE — 86850 RBC ANTIBODY SCREEN: CPT | Performed by: ADVANCED PRACTICE MIDWIFE

## 2023-08-12 PROCEDURE — 36415 COLL VENOUS BLD VENIPUNCTURE: CPT | Performed by: ADVANCED PRACTICE MIDWIFE

## 2023-08-12 PROCEDURE — 99221 1ST HOSP IP/OBS SF/LOW 40: CPT | Mod: 25 | Performed by: ADVANCED PRACTICE MIDWIFE

## 2023-08-12 PROCEDURE — 87635 SARS-COV-2 COVID-19 AMP PRB: CPT | Performed by: ADVANCED PRACTICE MIDWIFE

## 2023-08-12 PROCEDURE — 722N000001 HC LABOR CARE VAGINAL DELIVERY SINGLE

## 2023-08-12 PROCEDURE — C9803 HOPD COVID-19 SPEC COLLECT: HCPCS

## 2023-08-12 PROCEDURE — 120N000002 HC R&B MED SURG/OB UMMC

## 2023-08-12 PROCEDURE — 59025 FETAL NON-STRESS TEST: CPT | Mod: 26 | Performed by: ADVANCED PRACTICE MIDWIFE

## 2023-08-12 PROCEDURE — 86901 BLOOD TYPING SEROLOGIC RH(D): CPT | Performed by: ADVANCED PRACTICE MIDWIFE

## 2023-08-12 PROCEDURE — 86780 TREPONEMA PALLIDUM: CPT | Performed by: ADVANCED PRACTICE MIDWIFE

## 2023-08-12 PROCEDURE — 85027 COMPLETE CBC AUTOMATED: CPT | Performed by: ADVANCED PRACTICE MIDWIFE

## 2023-08-12 RX ORDER — CARBOPROST TROMETHAMINE 250 UG/ML
250 INJECTION, SOLUTION INTRAMUSCULAR
Status: DISCONTINUED | OUTPATIENT
Start: 2023-08-12 | End: 2023-08-14

## 2023-08-12 RX ORDER — SODIUM CHLORIDE, SODIUM LACTATE, POTASSIUM CHLORIDE, CALCIUM CHLORIDE 600; 310; 30; 20 MG/100ML; MG/100ML; MG/100ML; MG/100ML
INJECTION, SOLUTION INTRAVENOUS CONTINUOUS PRN
Status: DISCONTINUED | OUTPATIENT
Start: 2023-08-12 | End: 2023-08-14

## 2023-08-12 RX ORDER — TRANEXAMIC ACID 10 MG/ML
1 INJECTION, SOLUTION INTRAVENOUS EVERY 30 MIN PRN
Status: DISCONTINUED | OUTPATIENT
Start: 2023-08-12 | End: 2023-08-14

## 2023-08-12 RX ORDER — ONDANSETRON 4 MG/1
4 TABLET, ORALLY DISINTEGRATING ORAL EVERY 6 HOURS PRN
Status: DISCONTINUED | OUTPATIENT
Start: 2023-08-12 | End: 2023-08-14

## 2023-08-12 RX ORDER — OXYTOCIN/0.9 % SODIUM CHLORIDE 30/500 ML
340 PLASTIC BAG, INJECTION (ML) INTRAVENOUS CONTINUOUS PRN
Status: DISCONTINUED | OUTPATIENT
Start: 2023-08-12 | End: 2023-08-14

## 2023-08-12 RX ORDER — NALOXONE HYDROCHLORIDE 0.4 MG/ML
0.2 INJECTION, SOLUTION INTRAMUSCULAR; INTRAVENOUS; SUBCUTANEOUS
Status: DISCONTINUED | OUTPATIENT
Start: 2023-08-12 | End: 2023-08-14

## 2023-08-12 RX ORDER — SODIUM CHLORIDE, SODIUM LACTATE, POTASSIUM CHLORIDE, CALCIUM CHLORIDE 600; 310; 30; 20 MG/100ML; MG/100ML; MG/100ML; MG/100ML
INJECTION, SOLUTION INTRAVENOUS CONTINUOUS
Status: DISCONTINUED | OUTPATIENT
Start: 2023-08-12 | End: 2023-08-14

## 2023-08-12 RX ORDER — METOCLOPRAMIDE 10 MG/1
10 TABLET ORAL EVERY 6 HOURS PRN
Status: DISCONTINUED | OUTPATIENT
Start: 2023-08-12 | End: 2023-08-14

## 2023-08-12 RX ORDER — CITRIC ACID/SODIUM CITRATE 334-500MG
30 SOLUTION, ORAL ORAL
Status: DISCONTINUED | OUTPATIENT
Start: 2023-08-12 | End: 2023-08-14

## 2023-08-12 RX ORDER — HYDROXYZINE HYDROCHLORIDE 50 MG/1
50 TABLET, FILM COATED ORAL EVERY 6 HOURS PRN
Status: DISCONTINUED | OUTPATIENT
Start: 2023-08-12 | End: 2023-08-14

## 2023-08-12 RX ORDER — OXYTOCIN/0.9 % SODIUM CHLORIDE 30/500 ML
PLASTIC BAG, INJECTION (ML) INTRAVENOUS
Status: COMPLETED
Start: 2023-08-12 | End: 2023-08-13

## 2023-08-12 RX ORDER — KETOROLAC TROMETHAMINE 30 MG/ML
30 INJECTION, SOLUTION INTRAMUSCULAR; INTRAVENOUS
Status: DISCONTINUED | OUTPATIENT
Start: 2023-08-12 | End: 2023-08-15

## 2023-08-12 RX ORDER — ONDANSETRON 2 MG/ML
4 INJECTION INTRAMUSCULAR; INTRAVENOUS EVERY 6 HOURS PRN
Status: DISCONTINUED | OUTPATIENT
Start: 2023-08-12 | End: 2023-08-14

## 2023-08-12 RX ORDER — NALOXONE HYDROCHLORIDE 0.4 MG/ML
0.4 INJECTION, SOLUTION INTRAMUSCULAR; INTRAVENOUS; SUBCUTANEOUS
Status: DISCONTINUED | OUTPATIENT
Start: 2023-08-12 | End: 2023-08-14

## 2023-08-12 RX ORDER — KETOROLAC TROMETHAMINE 30 MG/ML
30 INJECTION, SOLUTION INTRAMUSCULAR; INTRAVENOUS
Status: DISCONTINUED | OUTPATIENT
Start: 2023-08-12 | End: 2023-08-14

## 2023-08-12 RX ORDER — MISOPROSTOL 200 UG/1
TABLET ORAL
Status: DISCONTINUED
Start: 2023-08-12 | End: 2023-08-13 | Stop reason: HOSPADM

## 2023-08-12 RX ORDER — OXYTOCIN 10 [USP'U]/ML
INJECTION, SOLUTION INTRAMUSCULAR; INTRAVENOUS
Status: DISCONTINUED
Start: 2023-08-12 | End: 2023-08-13 | Stop reason: HOSPADM

## 2023-08-12 RX ORDER — MISOPROSTOL 200 UG/1
400 TABLET ORAL
Status: DISCONTINUED | OUTPATIENT
Start: 2023-08-12 | End: 2023-08-14

## 2023-08-12 RX ORDER — MISOPROSTOL 200 UG/1
800 TABLET ORAL
Status: DISCONTINUED | OUTPATIENT
Start: 2023-08-12 | End: 2023-08-14

## 2023-08-12 RX ORDER — METHYLERGONOVINE MALEATE 0.2 MG/ML
200 INJECTION INTRAVENOUS
Status: DISCONTINUED | OUTPATIENT
Start: 2023-08-12 | End: 2023-08-14

## 2023-08-12 RX ORDER — IBUPROFEN 800 MG/1
800 TABLET, FILM COATED ORAL
Status: DISCONTINUED | OUTPATIENT
Start: 2023-08-12 | End: 2023-08-14

## 2023-08-12 RX ORDER — PROCHLORPERAZINE 25 MG
25 SUPPOSITORY, RECTAL RECTAL EVERY 12 HOURS PRN
Status: DISCONTINUED | OUTPATIENT
Start: 2023-08-12 | End: 2023-08-14

## 2023-08-12 RX ORDER — LIDOCAINE 40 MG/G
CREAM TOPICAL
Status: DISCONTINUED | OUTPATIENT
Start: 2023-08-12 | End: 2023-08-14

## 2023-08-12 RX ORDER — OXYTOCIN/0.9 % SODIUM CHLORIDE 30/500 ML
100-340 PLASTIC BAG, INJECTION (ML) INTRAVENOUS CONTINUOUS PRN
Status: DISCONTINUED | OUTPATIENT
Start: 2023-08-12 | End: 2023-08-14

## 2023-08-12 RX ORDER — OXYTOCIN 10 [USP'U]/ML
10 INJECTION, SOLUTION INTRAMUSCULAR; INTRAVENOUS
Status: DISCONTINUED | OUTPATIENT
Start: 2023-08-12 | End: 2023-08-14

## 2023-08-12 RX ORDER — MISOPROSTOL 100 UG/1
25 TABLET ORAL
Status: DISCONTINUED | OUTPATIENT
Start: 2023-08-12 | End: 2023-08-12

## 2023-08-12 RX ORDER — MORPHINE SULFATE 10 MG/ML
10 INJECTION, SOLUTION INTRAMUSCULAR; INTRAVENOUS
Status: DISCONTINUED | OUTPATIENT
Start: 2023-08-12 | End: 2023-08-12

## 2023-08-12 RX ORDER — LIDOCAINE HYDROCHLORIDE 10 MG/ML
INJECTION, SOLUTION EPIDURAL; INFILTRATION; INTRACAUDAL; PERINEURAL
Status: DISCONTINUED
Start: 2023-08-12 | End: 2023-08-13 | Stop reason: HOSPADM

## 2023-08-12 RX ORDER — FENTANYL CITRATE 50 UG/ML
100 INJECTION, SOLUTION INTRAMUSCULAR; INTRAVENOUS
Status: DISCONTINUED | OUTPATIENT
Start: 2023-08-12 | End: 2023-08-14

## 2023-08-12 RX ORDER — HYDROXYZINE HYDROCHLORIDE 50 MG/1
50 TABLET, FILM COATED ORAL
Status: DISCONTINUED | OUTPATIENT
Start: 2023-08-12 | End: 2023-08-12

## 2023-08-12 RX ORDER — OXYTOCIN/0.9 % SODIUM CHLORIDE 30/500 ML
1-24 PLASTIC BAG, INJECTION (ML) INTRAVENOUS CONTINUOUS
Status: DISCONTINUED | OUTPATIENT
Start: 2023-08-12 | End: 2023-08-14

## 2023-08-12 RX ORDER — PROCHLORPERAZINE MALEATE 10 MG
10 TABLET ORAL EVERY 6 HOURS PRN
Status: DISCONTINUED | OUTPATIENT
Start: 2023-08-12 | End: 2023-08-14

## 2023-08-12 RX ORDER — METOCLOPRAMIDE HYDROCHLORIDE 5 MG/ML
10 INJECTION INTRAMUSCULAR; INTRAVENOUS EVERY 6 HOURS PRN
Status: DISCONTINUED | OUTPATIENT
Start: 2023-08-12 | End: 2023-08-14

## 2023-08-12 RX ADMIN — MISOPROSTOL 25 MCG: 100 TABLET ORAL at 18:05

## 2023-08-12 RX ADMIN — MISOPROSTOL 25 MCG: 100 TABLET ORAL at 16:02

## 2023-08-12 RX ADMIN — MISOPROSTOL 25 MCG: 100 TABLET ORAL at 12:03

## 2023-08-12 RX ADMIN — MISOPROSTOL 25 MCG: 100 TABLET ORAL at 14:01

## 2023-08-12 RX ADMIN — MISOPROSTOL 25 MCG: 100 TABLET ORAL at 20:01

## 2023-08-12 ASSESSMENT — ACTIVITIES OF DAILY LIVING (ADL)
ADLS_ACUITY_SCORE: 21
ADLS_ACUITY_SCORE: 20
ADLS_ACUITY_SCORE: 21
ADLS_ACUITY_SCORE: 31
ADLS_ACUITY_SCORE: 21

## 2023-08-12 NOTE — PLAN OF CARE
Data: Patient admitted to room 444 at 1030. Patient is a . Prenatal record reviewed.   OB History    Para Term  AB Living   1 0 0 0 0 0   SAB IAB Ectopic Multiple Live Births   0 0 0 0 0      # Outcome Date GA Lbr Leonardo/2nd Weight Sex Delivery Anes PTL Lv   1 Current            .  Medical History: FGR noted. IOL at 39 weeks due to FGR.  Gestational age 39w0d. Vital signs per doc flowsheet. Fetal movement present. Patient reports Induction Of Labor   as reason for admission. Support persons Significant other, sister, parents present.  Action: Care of patient assumed at 1030. Verbal consent for EFM, external fetal monitors applied. Admission assessment completed. Patient and support persons educated on labor process. Patient instructed to report change in fetal movement, contractions, vaginal leaking of fluid or bleeding, abdominal pain, or any concerns related to the pregnancy to her nurse/physician. Patient oriented to room, call light in reach.   Response: Mi Cassidy CNM informed of Patient arrival.  Plan per provider is IOL with Miso oral [id number].  Patient verbalized understanding of education and verbalized agreement with plan. Patient coping with labor via early ambulation, family support.

## 2023-08-12 NOTE — PROGRESS NOTES
"Subjective :   Pt sitting on birthing ball, states she is beginning to feel some cramping     Objective:   /71 (BP Location: Right arm, Patient Position: Semi-[place]'s, Cuff Size: Adult Regular)   Pulse 82   Temp 98.5  F (36.9  C) (Oral)   Ht 1.549 m (5' 1\")   Wt 90.7 kg (200 lb)   LMP 2022   BMI 37.79 kg/m    SVE: deferred  1306 possible variable vs maternal pulse to 100, otherwsie FHTs 140, moderate variability, + accels, no decels   Cxt q 2-6 , lasting 60  Membranes intact  Oral pitocin @ 1203, 1401     Brief Labor Course:  23  1230  Admission to L&D  1203 & 1401 oral cytotec     Assessment:   IUP @ 39w0d  IOL for morbid obesity and hx of FGR  Cat 1 FHT tracing   Membranes  intact,   GBS negative  Non ripe cervix     Plan:   Continue with cervical ripening   Continue to observe and offer comfort  Anticipate        DARRYL ChatterjeeM     "

## 2023-08-12 NOTE — H&P
Sarah Churchill is a 27 year old female    Patient's last menstrual period was 2022., Estimated Date of Delivery: Aug 19, 2023 is calculated from early U/S alone (<14wks)     Pt is admitted to the Birthplace on 2023 at 11:44 AM    For IOL secondary to morbid obesity and hx of fetal growth restriction  Membranes are intact    PRENATAL COURSE  Prenatal care began at 13w wks gestation for a total of 11 prenatal visits.  Total wt gain pregravid BMI 40.64 TWG - 15 lbs   Prenatal course was complicated by    Patient Active Problem List    Diagnosis Date Noted    Encounter for induction of labor 2023     Priority: Medium    Supervision of high-risk pregnancy of elderly multigravida 2023     Priority: Medium    Pregnancy affected by fetal growth restriction 2023     Priority: Medium     Growth at 5% at 29 weeks.  Umbilical flow resistance increased.        Supervision of normal first pregnancy, antepartum 2023     Priority: Medium     [facility] CNM 'Ja - sell'  FOCATHERINE Martinez  Declines early genetic testing   ASA for BMI 38.39, level 2 U/S, early        Need for Tdap vaccination 2023     Priority: Medium    Size of fetus inconsistent with dates in second trimester 2023     Priority: Medium    Morbid obesity (H) 10/27/2021     Priority: Medium       HISTORIES  No Known Allergies  History reviewed. No pertinent past medical history.  History reviewed. No pertinent surgical history.  Family History   Problem Relation Age of Onset    Hypertension Mother      Social History     Tobacco Use    Smoking status: Former     Packs/day: 0.00     Years: 3.00     Pack years: 0.00     Types: Cigarettes     Quit date: 2022     Years since quittin.6    Smokeless tobacco: Never    Tobacco comments:     2 cigarettes/day   Substance Use Topics    Alcohol use: Not Currently     OB History    Para Term  AB Living   1 0 0 0 0 0   SAB IAB Ectopic Multiple Live Births   0 0  "0 0 0      # Outcome Date GA Lbr Leonardo/2nd Weight Sex Delivery Anes PTL Lv   1 Current                LABS:    Blood type O+  Rhogam not indicated  Rubella immune   Treponema Pallidum Antibody  Negative    HIV    Non-reactive   Hepatitis B Non-reactive   GBS negative     ROS  Pt denies significant constitutional symptoms including fever and/or malaise.  Pt denies significant respiratory, cardiovacular, GI, or muscular/skeletal complaints.      PHYSICAL EXAM:  /71   Temp 98.5  F (36.9  C) (Oral)   Ht 1.549 m (5' 1\")   Wt 90.7 kg (200 lb)   LMP 2022   BMI 37.79 kg/m    General appearance healthy, alert, active, and no distress   Neuro:  denies headache and visual disturbances  Psych: Mentation normal and bright   Legs: 2+/2+, no clonus, no edema       Abdomen: gravid, single vertex fetus, non-tender, EFW 6 lbs. Pt is not abhilash  Brief BSUS confirm vertex fetal position     23: Growth U/S @ [facility] 2463 g (5lb7oz) 10%    Pt had hx at 29 wks of being 5% with increased dopplers     FETAL HEART TONES: baseline 140 with moderate FHRV variability and accelerations.  No decelerations present.     PELVIC EXAM: closed/ 60%/ -2  BLOODY SHOW:: no  Membranes as listed above    ASSESSMENT:  IUP @ 39w0d  wks gestation  IOL for morbid obesity and hx of fetal growth restriction    NST  reactive   Parity: Primip  GBS negative and membranes intact  Non ripe cervix   Has a 'wait and see' attitude regarding pain meds once in labor    PLAN:  Admit - see IP orders  cervical ripening with misoprostol  Anticipate       Mi Cassidy CNM      "

## 2023-08-12 NOTE — PLAN OF CARE
Problem: Plan of Care - These are the overarching goals to be used throughout the patient stay.    Goal: Optimal Comfort and Wellbeing  Outcome: Progressing  Intervention: Provide Person-Centered Care  Recent Flowsheet Documentation  Taken 8/12/2023 1130 by Lia Soliman RN  Trust Relationship/Rapport:   care explained   choices provided   emotional support provided   empathic listening provided   questions answered   questions encouraged   reassurance provided   thoughts/feelings acknowledged  Taken 8/12/2023 1100 by Lia Soliman RN  Trust Relationship/Rapport:   care explained   choices provided   emotional support provided   empathic listening provided   questions answered   questions encouraged   reassurance provided   thoughts/feelings acknowledged     Problem: Labor  Goal: Stable Fetal Wellbeing  Outcome: Progressing  Intervention: Promote and Monitor Fetal Wellbeing  Recent Flowsheet Documentation  Taken 8/12/2023 1100 by Lia Soliman RN  Body Position: position changed independently   Goal Outcome Evaluation:    Patient has received x2 doses of Miso during shift. Patient endorses feeling some cramps. Patient denies ROM. Patient is independent in cares, ambulating, bouncing on birth ball, eating and drinking during shift. Patient is not in pain and is open to receiving pain medication/epidural during labor but states she wants to see how she tolerates labor.     Patient was educated on IOL, options, car seat eduction and encouraged to eat and drink.    LR held during shift due to effective oral intake. Patient plans to have SVE before bed tonight to check progress.     Attempted burt however unable to maintain FHT. Fetus likes to move on monitor and often difficult to keep continuous strip. Patient is aware and has been able to independently move to keep FHT audible. FHT noted moderate variability, no decel, appropriate accel noted. Baseline WNL.    Report given to  TERRI Rodriguez.

## 2023-08-12 NOTE — PLAN OF CARE
"Car seat education provided to patient and spouse as follows:    Inspected car seat and noted to be non . Reviewed with patient and spouse proper usage of non , not involved in accident and knowing car seat history. Reviewed sending in card in mail for warranty claims.     Discussed with patient and spouse proper positioning of new born. Not using aftermarket products. Chest clip placement at armpit level. How to perform pinch test for belt tightness.     Patient and spouse stated they do not have vehicle and will be transporting  home in grandparents vehicle. Patient and spouse stated grandparents installed carseat base in their vehicle.  Proper usage of car seat installation was reviewed with Patient and spouse including not using LATCH and Adult seat belt together. Discussed proper check for 1\" or less movement at belt path. Discussed and demonstrated what this would look like by using infant car seat and end table as example in room.     Discussed benefits of rear facing for infant safety. Parents noted verbal understanding of this.     All questions were answered during education conversation. Parents were encouraged to refer to their 's recommendation by reading the manual that came with the car seat. Parents also encouraged to seek a car seat clinic in future should they require assistance. Parents reminded of [place] law regarding car seats to use according to 's directions.      This education was given by this writer, Lia Soliman RN. This writer holds an active certification as a Certified [employer] Passenger .  "

## 2023-08-12 NOTE — PROVIDER NOTIFICATION
23 1122   Provider Notification   Provider Name/Title Mi Cassidy CNM   Method of Notification Electronic Page   Request Evaluate in Person   Notification Reason Patient Arrived     IOL  39.0, FGR arrived.

## 2023-08-13 ENCOUNTER — ANESTHESIA EVENT (OUTPATIENT)
Dept: OBGYN | Facility: CLINIC | Age: 28
End: 2023-08-13
Payer: COMMERCIAL

## 2023-08-13 ENCOUNTER — ANESTHESIA (OUTPATIENT)
Dept: OBGYN | Facility: CLINIC | Age: 28
End: 2023-08-13
Payer: COMMERCIAL

## 2023-08-13 PROCEDURE — 120N000002 HC R&B MED SURG/OB UMMC

## 2023-08-13 PROCEDURE — 250N000009 HC RX 250

## 2023-08-13 PROCEDURE — 250N000011 HC RX IP 250 OP 636: Performed by: STUDENT IN AN ORGANIZED HEALTH CARE EDUCATION/TRAINING PROGRAM

## 2023-08-13 PROCEDURE — 258N000003 HC RX IP 258 OP 636: Performed by: ADVANCED PRACTICE MIDWIFE

## 2023-08-13 PROCEDURE — 250N000011 HC RX IP 250 OP 636: Performed by: ADVANCED PRACTICE MIDWIFE

## 2023-08-13 PROCEDURE — 99231 SBSQ HOSP IP/OBS SF/LOW 25: CPT | Performed by: ADVANCED PRACTICE MIDWIFE

## 2023-08-13 PROCEDURE — 370N000003 HC ANESTHESIA WARD SERVICE: Performed by: ANESTHESIOLOGY

## 2023-08-13 RX ORDER — FENTANYL CITRATE-0.9 % NACL/PF 10 MCG/ML
100 PLASTIC BAG, INJECTION (ML) INTRAVENOUS EVERY 5 MIN PRN
Status: DISCONTINUED | OUTPATIENT
Start: 2023-08-13 | End: 2023-08-14

## 2023-08-13 RX ORDER — FENTANYL/ROPIVACAINE/NS/PF 2MCG/ML-.1
PLASTIC BAG, INJECTION (ML) EPIDURAL
Status: DISCONTINUED | OUTPATIENT
Start: 2023-08-13 | End: 2023-08-14

## 2023-08-13 RX ORDER — NALBUPHINE HYDROCHLORIDE 20 MG/ML
2.5-5 INJECTION, SOLUTION INTRAMUSCULAR; INTRAVENOUS; SUBCUTANEOUS EVERY 6 HOURS PRN
Status: DISCONTINUED | OUTPATIENT
Start: 2023-08-13 | End: 2023-08-14

## 2023-08-13 RX ADMIN — METOCLOPRAMIDE HYDROCHLORIDE 10 MG: 5 INJECTION INTRAMUSCULAR; INTRAVENOUS at 14:11

## 2023-08-13 RX ADMIN — Medication 1 MILLI-UNITS/MIN: at 06:40

## 2023-08-13 RX ADMIN — ONDANSETRON 4 MG: 2 INJECTION INTRAMUSCULAR; INTRAVENOUS at 17:51

## 2023-08-13 RX ADMIN — FENTANYL CITRATE 100 MCG: 50 INJECTION, SOLUTION INTRAMUSCULAR; INTRAVENOUS at 15:45

## 2023-08-13 RX ADMIN — SODIUM CHLORIDE, POTASSIUM CHLORIDE, SODIUM LACTATE AND CALCIUM CHLORIDE: 600; 310; 30; 20 INJECTION, SOLUTION INTRAVENOUS at 06:16

## 2023-08-13 RX ADMIN — FENTANYL CITRATE 100 MCG: 50 INJECTION, SOLUTION INTRAMUSCULAR; INTRAVENOUS at 14:28

## 2023-08-13 RX ADMIN — Medication: at 19:31

## 2023-08-13 RX ADMIN — SODIUM CHLORIDE, POTASSIUM CHLORIDE, SODIUM LACTATE AND CALCIUM CHLORIDE: 600; 310; 30; 20 INJECTION, SOLUTION INTRAVENOUS at 14:14

## 2023-08-13 RX ADMIN — FENTANYL CITRATE 100 MCG: 50 INJECTION, SOLUTION INTRAMUSCULAR; INTRAVENOUS at 17:53

## 2023-08-13 ASSESSMENT — ACTIVITIES OF DAILY LIVING (ADL)
ADLS_ACUITY_SCORE: 24
ADLS_ACUITY_SCORE: 20
ADLS_ACUITY_SCORE: 20
ADLS_ACUITY_SCORE: 21
ADLS_ACUITY_SCORE: 20
ADLS_ACUITY_SCORE: 21
ADLS_ACUITY_SCORE: 24
ADLS_ACUITY_SCORE: 21
ADLS_ACUITY_SCORE: 21
ADLS_ACUITY_SCORE: 20
ADLS_ACUITY_SCORE: 21
ADLS_ACUITY_SCORE: 20

## 2023-08-13 NOTE — PROVIDER NOTIFICATION
08/13/23 1848   Provider Notification   Provider Name/Title Judit Eastman CNM   Method of Notification Electronic Page   Notification Reason Patient Request     Epidural requested

## 2023-08-13 NOTE — PROGRESS NOTES
"Subjective :   Pt using hot packs on back, states the contractions feel stronger and stronger     Objective:   /82 (BP Location: Right arm, Patient Position: Other (comments))   Pulse 62   Temp 97.9  F (36.6  C) (Oral)   Resp 19   Ht 1.549 m (5' 1\")   Wt 90.7 kg (200 lb)   LMP 2022   BMI 37.79 kg/m    SVE: deferred  FHTs 120 moderate variability, + accels, no decels   Cxt q 2-4, lasting 40-60 secs (in certain positions it is hard to get the contractions to trace and contraction timing if from palpation)   Membranes intact  Pt has received 5 doses of misoprostol, last one at     Brief Labor Course:  23  1230   Admission to L&D  Five doses of misoprostol, last one @ 2001   SVE 3.5/70%/-2 station     23  0100 Still abhilash too much for pitocin      Assessment:  IUP @ 39w1d  IOL for morbid obesity and hx of FGR  Cat 1 FHT tracing   Membranes  intact,   GBS negative  Great response to cervical ripening      Plan:  Will plan to switch to pitocin if contractions space out, abhilash too much right now for pitocin   Discussed pain management for labor, pt would like to try nitrous and fentanyl prior to epidural   Continue to observe and offer comfort  Anticipate      DARRYL Chatterjee CNM     "

## 2023-08-13 NOTE — PROGRESS NOTES
"  S:Patient comfortable, sitting in bed applying make-up. She got up and showered and sat on the ball for a little while. Reports feeling occasional contractions - about 1-3 in the last 30 minutes. Agrees to cervical exam at this time.     O:  Blood pressure 112/70, pulse 62, temperature 97.9  F (36.6  C), temperature source Oral, resp. rate 17, height 1.549 m (5' 1\"), weight 90.7 kg (200 lb), last menstrual period 2022, SpO2 98 %, not currently breastfeeding.  General appearance: comfortable    CONTRACTIONS: Contractions rare but difficult to trace    Palpate: mild  FETAL HEART TONES: baseline 125 with moderate FHR variability and    accelerations yes. Decelerations no.    NST: REACTIVE  ROM: not ruptured  PELVIC EXAM:3.5/70/-2/soft/posterior  Fetal Position: vertex  Bloody show: No  Pitocin- 6 mu/min.,  Antibiotics- none    Brief Labor Course:  23  1230   Admission to L&D  Five doses of misoprostol, last one @ 2001   SVE 3.5/70%/-2 station      23  0615   start pitocin   1030 SVE unchanged; pit @ 6mu     Assessment:  IUP @ 39w1d  IOL for morbid obesity and hx of FGR in this pregnancy  Cat 1 FHT tracing   Membranes  intact  GBS negative  COVID negative    PLAN:  ===========  Comfort measures prn - encourage position changes  Pain medication - aware of options and will let team know if any desired  Anticipate   Labor induction with Pitocin; titrate per unit protocol  Reevaluate in 2-4 hours/PRN   Discussed AROM with next check if able and labor still not progressing; pt ok with this  DARRYL Hassan, QUINCY   "

## 2023-08-13 NOTE — PROGRESS NOTES
"Subjective :   Pt states she is feeling some more intense cramps and contractions     Objective:   /83 (BP Location: Right arm, Patient Position: Sitting, Cuff Size: Adult Regular)   Pulse 62   Temp 97.8  F (36.6  C) (Oral)   Resp 18   Ht 1.549 m (5' 1\")   Wt 90.7 kg (200 lb)   LMP 2022   BMI 37.79 kg/m    SVE: 3.5 70%  -2 (mayberry score of 9)   FHTs 120 moderate variability, + accels, no decels   Cxt q 2-4, lasting 60-90 secs   Membranes intact, bulging bag   Misoprostol oral 1203, 1401, 1602, 1805,     Brief Labor Course:  23  1230   Admission to L&D  Five doses of misoprostol, last one @ 2001 SVE 3.5/70%/-2 station      Assessment:   IUP @ 39w0d  IOL for morbid obesity and hx of FGR  Cat 1 FHT tracing   Membranes  intact,   GBS negative  Great response to cervical ripening      Plan:  Will plan to switch to pitocin after  (2 hrs after last dose of miso)  Discussed pain management for labor, pt would like to try nitrous and fentanyl prior to epidural   Continue to observe and offer comfort  Anticipate    "

## 2023-08-13 NOTE — PROGRESS NOTES
"  S:Patient becoming more uncomfortable. Contractions stronger and closer together, patient having to focus more to get through contractions. Just received a dose of IV Fentanyl and some Reglan for nausea. Many family and friends in room, just brought in pizza. Going to go to visitor's lounge so that Sarah can rest for a bit.     O:  Blood pressure 91/50, pulse 62, temperature 97.9  F (36.6  C), temperature source Oral, resp. rate 20, height 1.549 m (5' 1\"), weight 90.7 kg (200 lb), last menstrual period 2022, SpO2 98 %, not currently breastfeeding.  General appearance: uncomfortable with contractions, more stoic than before    CONTRACTIONS: Contractions every 2-3 minutes.  Palpate: moderate  FETAL HEART TONES: baseline 130 with moderate FHR variability and    accelerations yes. Decelerations no.    NST: REACTIVE  ROM: clear fluid  PELVIC EXAM:deferred  Pitocin- 14 mu/min.,  Antibiotics- none    Brief Labor Course:  23  1230   Admission to L&D  Five doses of misoprostol, last one @ 2001   SVE 3.5/70%/-2 station      23  0615   start pitocin   1030 SVE unchanged; pit @ 6mu  1230 SVE 4/80/-1; pit @ 14mu     Assessment:  IUP @ 39w1d  IOL for morbid obesity and hx of FGR in this pregnancy  Cat 1 FHT tracing   ROM x2.5hrs, afebrile, clear fluid  GBS negative  COVID negative    PLAN:  ===========  Comfort measures prn - allow some time for quiet rest after Fentanyl  Pain medication - per patient request  Anticipate   Labor induction with Pitocin; titrate per unit protocol  Reevaluate in 2-4 hours/PRN   DARRYL Hassan CNM  "

## 2023-08-13 NOTE — PROGRESS NOTES
"Subjective :   Has dozed on and off through night, continues to feel back pain with contractions     Objective:   /67 (BP Location: Left arm)   Pulse 62   Temp 98.1  F (36.7  C) (Oral)   Resp 17   Ht 1.549 m (5' 1\")   Wt 90.7 kg (200 lb)   LMP 2022   BMI 37.79 kg/m    SVE: deferred  FHTs 120 moderate variability, + accels, no decels   Cxt q 4, lasting 90  Membranes intact  Had 5 doses oral miso on 23, last dose   Starting pit now     Brief Labor Course:  23  1230   Admission to L&D  Five doses of misoprostol, last one @ 2001   SVE 3.5/70%/-2 station      23  0615  start pitocin      Assessment:  IUP @ 39w1d  IOL for morbid obesity and hx of FGR  Cat 1 FHT tracing   Membranes  intact,   GBS negative     Plan:  Starting pitocin now   Discussed pain management for labor, pt would like to try nitrous and fentanyl prior to epidural   Continue to observe and offer comfort  Anticipate      Mi Cassidy, DARRYL CNM     "

## 2023-08-13 NOTE — PLAN OF CARE
VS WNL. Pt currently sleeping in between cares.  Has had miso x5 po. UC's often.  Difficult to monitor with toco in some positions.  RN at bedside often for palpation.  UC's palpate mild with a soft resting tone. Will defer pitocin unless contractions space out.  Last SVE 3.5/70/-2. Cat 1 FHT's. Pain management discussed with pt and .  She would like to try nitrous and IV meds prior to epidural but is open.  Currently rates her pain 4/10 and using hot packs on her back for comfort with relief.  Family at her bedside and very supportive.  Encouraged to call with needs.

## 2023-08-13 NOTE — PROGRESS NOTES
"  S:Patient still mostly comfortable, feeling occasional contractions. Would like cervical exam and AROM at this time if possible.     O:  Blood pressure 126/84, pulse 62, temperature 97.9  F (36.6  C), temperature source Oral, resp. rate 18, height 1.549 m (5' 1\"), weight 90.7 kg (200 lb), last menstrual period 2022, SpO2 98 %, not currently breastfeeding.  General appearance: comfortable    CONTRACTIONS: Contractions every 3-4 minutes.  Palpate: mild  FETAL HEART TONES: baseline 125 with moderate FHR variability and    accelerations yes. Decelerations no.    NST: REACTIVE  ROM: AROM for large amount of clear fluid  PELVIC EXAM:/-1  Fetal Position:  vertex  Bloody show: No  Pitocin- 14 mu/min.,  Antibiotics- none    Brief Labor Course:  23  1230   Admission to L&D  Five doses of misoprostol, last one @ 2001   SVE 3.5/70%/-2 station      23  0615   start pitocin   1030 SVE unchanged; pit @ 6mu  1230 SVE 80/-1; pit @ 14mu    Assessment:  IUP @ 39w1d  IOL for morbid obesity and hx of FGR in this pregnancy  Cat 1 FHT tracing   AROM, clear fluid  GBS negative  COVID negative    PLAN:  ===========  Comfort measures prn - encouraged upright positioning and movement to facilitate fetal descent  Pain medication per patient request; discussed that contractions may become more intense now that water is broken.  Anticipate   Labor induction with Pitocin; continue to titrate per unit protocol  Reevaluate in 2-4 hours/PRN   DARRYL Hassan, QUINCY   "

## 2023-08-13 NOTE — PLAN OF CARE
Goal Outcome Evaluation:    Pt VSS, afebrile. IOL FGR. Pt denies HA, blurry vision, SOB and RUQ pain. Pt receiving oral miso for IOL. Pt denies pain. Pt ambulating in room and on birthing ball. EFM and toco, see flowsheet. IV rt forearm, patent. Support persons Juan and sister at bedside attentive to pt. Pt agreeable to plan of care at this time. Report given to TERRI Hensley.

## 2023-08-13 NOTE — PROGRESS NOTES
"  S:Patient continues to show outward signs of labor progression. Has had two doses of fentanyl and sat in the warm tub for awhile. She threw up once and reports rectal pressure with contractions, intermittently between contractions as well. Has now returned to bed from the tub and would like a cervical exam. Many family members in the room and supportive.     O:  Blood pressure 120/78, pulse 62, temperature 97.7  F (36.5  C), temperature source Oral, resp. rate 18, height 1.549 m (5' 1\"), weight 90.7 kg (200 lb), last menstrual period 2022, SpO2 98 %, not currently breastfeeding.  General appearance: uncomfortable with contractions    CONTRACTIONS: Contractions every 2-4 minutes.  Palpate: moderate  FETAL HEART TONES: baseline 120 with moderate FHR variability and    accelerations yes. Decelerations no.    ROM: clear fluid  PELVIC EXAM:6.5/80/-1, large amount of clear fluid return with cervical exam  Fetal Position:  vertex  Bloody show: No  Pitocin- 16 mu/min.,  Antibiotics- none    Brief Labor Course:  23  1230   Admission to L&D  Five doses of misoprostol, last one @ 2001   SVE 3.5/70%/-2 station      23  0615   start pitocin   1030 SVE unchanged; pit @ 6mu  1230 SVE 4/80/-1; pit @ 14mu, AROM for clear fluid  1800 SVE 6.5/80/-1; pit @ 16mu     Assessment:  IUP @ 39w1d  IOL for morbid obesity and hx of FGR in this pregnancy  Cat 1 FHT tracing   ROM x5.5hrs, afebrile, clear fluid  GBS negative  COVID negative    PLAN:  ===========  Comfort measures prn   Pain medication - patient is aware of options and declines wanting anything else at this time  Anticipate   Labor induction with Pitocin; titrate per unit protocol  Reevaluate in 2-4 hours/PRN   Judit Lee, DARRYL, CNM   "

## 2023-08-13 NOTE — ANESTHESIA PREPROCEDURE EVALUATION
Anesthesia Pre-Procedure Evaluation    Patient: Sarah Churchill   MRN: [id number] : 1995        Procedure :           History reviewed. No pertinent past medical history.   History reviewed. No pertinent surgical history.   No Known Allergies   Social History     Tobacco Use    Smoking status: Former     Packs/day: 0.00     Years: 3.00     Pack years: 0.00     Types: Cigarettes     Quit date: 2022     Years since quittin.6    Smokeless tobacco: Never    Tobacco comments:     2 cigarettes/day   Substance Use Topics    Alcohol use: Not Currently      Wt Readings from Last 1 Encounters:   23 90.7 kg (200 lb)        Anesthesia Evaluation            ROS/MED HX  ENT/Pulmonary:  - neg pulmonary ROS     Neurologic:  - neg neurologic ROS     Cardiovascular:  - neg cardiovascular ROS     METS/Exercise Tolerance:     Hematologic: Comments: Plt 216 - neg hematologic  ROS     Musculoskeletal:       GI/Hepatic:  - neg GI/hepatic ROS     Renal/Genitourinary:       Endo:     (+)               Obesity,       Psychiatric/Substance Use:  - neg psychiatric ROS     Infectious Disease:       Malignancy:       Other:   28yo  at 39w1d here for IOL for FGR         Physical Exam    Airway        Mallampati: III   TM distance: > 3 FB   Neck ROM: full   Mouth opening: > 3 cm    Respiratory Devices and Support         Dental           Cardiovascular             Pulmonary                   OUTSIDE LABS:  CBC:   Lab Results   Component Value Date    WBC 7.4 2023    WBC 9.2 2023    HGB 11.9 2023    HGB 11.4 (L) 2023    HCT 34.4 (L) 2023    HCT 35.7 2023     2023     2023     BMP: No results found for: NA, POTASSIUM, CHLORIDE, CO2, BUN, CR, GLC  COAGS: No results found for: PTT, INR, FIBR  POC:   Lab Results   Component Value Date    HCG Negative 2017     HEPATIC: No results found for: ALBUMIN, PROTTOTAL, ALT, AST, GGT, ALKPHOS, BILITOTAL,  BILIDIRECT, CHANTELL  OTHER: No results found for: PH, LACT, A1C, ALEX, PHOS, MAG, LIPASE, AMYLASE, TSH, T4, T3, CRP, SED            Tacos Clements MD

## 2023-08-14 ENCOUNTER — MEDICAL CORRESPONDENCE (OUTPATIENT)
Dept: HEALTH INFORMATION MANAGEMENT | Facility: CLINIC | Age: 28
End: 2023-08-14

## 2023-08-14 PROCEDURE — 59400 OBSTETRICAL CARE: CPT | Performed by: ADVANCED PRACTICE MIDWIFE

## 2023-08-14 PROCEDURE — 00HU33Z INSERTION OF INFUSION DEVICE INTO SPINAL CANAL, PERCUTANEOUS APPROACH: ICD-10-PCS | Performed by: ADVANCED PRACTICE MIDWIFE

## 2023-08-14 PROCEDURE — 250N000009 HC RX 250: Performed by: ADVANCED PRACTICE MIDWIFE

## 2023-08-14 PROCEDURE — 250N000013 HC RX MED GY IP 250 OP 250 PS 637: Performed by: ADVANCED PRACTICE MIDWIFE

## 2023-08-14 PROCEDURE — 722N000001 HC LABOR CARE VAGINAL DELIVERY SINGLE

## 2023-08-14 PROCEDURE — 3E033VJ INTRODUCTION OF OTHER HORMONE INTO PERIPHERAL VEIN, PERCUTANEOUS APPROACH: ICD-10-PCS | Performed by: ADVANCED PRACTICE MIDWIFE

## 2023-08-14 PROCEDURE — 10907ZC DRAINAGE OF AMNIOTIC FLUID, THERAPEUTIC FROM PRODUCTS OF CONCEPTION, VIA NATURAL OR ARTIFICIAL OPENING: ICD-10-PCS | Performed by: ADVANCED PRACTICE MIDWIFE

## 2023-08-14 PROCEDURE — 0HQ9XZZ REPAIR PERINEUM SKIN, EXTERNAL APPROACH: ICD-10-PCS | Performed by: ADVANCED PRACTICE MIDWIFE

## 2023-08-14 PROCEDURE — 3E0R3BZ INTRODUCTION OF ANESTHETIC AGENT INTO SPINAL CANAL, PERCUTANEOUS APPROACH: ICD-10-PCS | Performed by: ADVANCED PRACTICE MIDWIFE

## 2023-08-14 PROCEDURE — 250N000011 HC RX IP 250 OP 636: Mod: JZ | Performed by: ADVANCED PRACTICE MIDWIFE

## 2023-08-14 PROCEDURE — 250N000011 HC RX IP 250 OP 636: Performed by: ADVANCED PRACTICE MIDWIFE

## 2023-08-14 PROCEDURE — 120N000002 HC R&B MED SURG/OB UMMC

## 2023-08-14 RX ORDER — CARBOPROST TROMETHAMINE 250 UG/ML
250 INJECTION, SOLUTION INTRAMUSCULAR
Status: DISCONTINUED | OUTPATIENT
Start: 2023-08-14 | End: 2023-08-15 | Stop reason: HOSPADM

## 2023-08-14 RX ORDER — HYDROCORTISONE 25 MG/G
CREAM TOPICAL 3 TIMES DAILY PRN
Status: DISCONTINUED | OUTPATIENT
Start: 2023-08-14 | End: 2023-08-15 | Stop reason: HOSPADM

## 2023-08-14 RX ORDER — OXYTOCIN 10 [USP'U]/ML
10 INJECTION, SOLUTION INTRAMUSCULAR; INTRAVENOUS
Status: DISCONTINUED | OUTPATIENT
Start: 2023-08-14 | End: 2023-08-15 | Stop reason: HOSPADM

## 2023-08-14 RX ORDER — BISACODYL 10 MG
10 SUPPOSITORY, RECTAL RECTAL DAILY PRN
Status: DISCONTINUED | OUTPATIENT
Start: 2023-08-14 | End: 2023-08-15 | Stop reason: HOSPADM

## 2023-08-14 RX ORDER — DOCUSATE SODIUM 100 MG/1
100 CAPSULE, LIQUID FILLED ORAL DAILY
Status: DISCONTINUED | OUTPATIENT
Start: 2023-08-14 | End: 2023-08-15 | Stop reason: HOSPADM

## 2023-08-14 RX ORDER — MISOPROSTOL 200 UG/1
400 TABLET ORAL
Status: DISCONTINUED | OUTPATIENT
Start: 2023-08-14 | End: 2023-08-15 | Stop reason: HOSPADM

## 2023-08-14 RX ORDER — ACETAMINOPHEN 325 MG/1
650 TABLET ORAL EVERY 4 HOURS PRN
Status: DISCONTINUED | OUTPATIENT
Start: 2023-08-14 | End: 2023-08-15 | Stop reason: HOSPADM

## 2023-08-14 RX ORDER — METHYLERGONOVINE MALEATE 0.2 MG/ML
200 INJECTION INTRAVENOUS
Status: DISCONTINUED | OUTPATIENT
Start: 2023-08-14 | End: 2023-08-15 | Stop reason: HOSPADM

## 2023-08-14 RX ORDER — IBUPROFEN 800 MG/1
800 TABLET, FILM COATED ORAL EVERY 6 HOURS PRN
Status: DISCONTINUED | OUTPATIENT
Start: 2023-08-14 | End: 2023-08-15 | Stop reason: HOSPADM

## 2023-08-14 RX ORDER — TRANEXAMIC ACID 10 MG/ML
1 INJECTION, SOLUTION INTRAVENOUS EVERY 30 MIN PRN
Status: DISCONTINUED | OUTPATIENT
Start: 2023-08-14 | End: 2023-08-15 | Stop reason: HOSPADM

## 2023-08-14 RX ORDER — MISOPROSTOL 200 UG/1
800 TABLET ORAL
Status: DISCONTINUED | OUTPATIENT
Start: 2023-08-14 | End: 2023-08-15 | Stop reason: HOSPADM

## 2023-08-14 RX ORDER — OXYTOCIN/0.9 % SODIUM CHLORIDE 30/500 ML
340 PLASTIC BAG, INJECTION (ML) INTRAVENOUS CONTINUOUS PRN
Status: DISCONTINUED | OUTPATIENT
Start: 2023-08-14 | End: 2023-08-15 | Stop reason: HOSPADM

## 2023-08-14 RX ORDER — MODIFIED LANOLIN
OINTMENT (GRAM) TOPICAL
Status: DISCONTINUED | OUTPATIENT
Start: 2023-08-14 | End: 2023-08-15 | Stop reason: HOSPADM

## 2023-08-14 RX ADMIN — Medication 100 ML/HR: at 09:15

## 2023-08-14 RX ADMIN — KETOROLAC TROMETHAMINE 30 MG: 30 INJECTION INTRAMUSCULAR; INTRAVENOUS at 09:32

## 2023-08-14 RX ADMIN — DOCUSATE SODIUM 100 MG: 100 CAPSULE, LIQUID FILLED ORAL at 16:12

## 2023-08-14 RX ADMIN — LIDOCAINE HYDROCHLORIDE 20 ML: 10 INJECTION, SOLUTION EPIDURAL; INFILTRATION; INTRACAUDAL; PERINEURAL at 07:54

## 2023-08-14 RX ADMIN — IBUPROFEN 800 MG: 800 TABLET, FILM COATED ORAL at 16:12

## 2023-08-14 RX ADMIN — ONDANSETRON 4 MG: 2 INJECTION INTRAMUSCULAR; INTRAVENOUS at 06:56

## 2023-08-14 ASSESSMENT — ACTIVITIES OF DAILY LIVING (ADL)
ADLS_ACUITY_SCORE: 24

## 2023-08-14 NOTE — PLAN OF CARE
Goal Outcome Evaluation:       Patient arrived to [facility] unit via wheelchair 0949,with belongings, accompanied by family, with infant in arms. Received report from  Rosemary GAVIRIA  and checked bands. Unit and room orientation completd. Call light given; no concerns present at this time. Continue with plan of care.

## 2023-08-14 NOTE — PROGRESS NOTES
Vaginal Delivery Note   of viable Female with SRINI Hartmann CNM in attendance.  Nursery RN present.  Infant with spontaneous cry, to mother's abdomen, dried and stimulated.  APGAR at 1 minute:  9 and APGAR at 5 minutes:  9.  Placenta delivered with out complication, labial laceration, with repair, demetrius cares provided.  Mother and baby in stable condition.

## 2023-08-14 NOTE — PROVIDER NOTIFICATION
08/13/23 2200   Fetal Assessment   Fetal HR Assessment Method external US   Fetal HR (beats/min) 130   Fetal HR Baseline normal range   Fetal HR Variability moderate (amplitude range 6 to 25 bpm)   Fetal HR Accelerations increase 15 bpm above baseline lasting 15 seconds   Fetal HR Decelerations early   RN Strip Review Continuous   Intrauterine Corrective Measures IV Fluid bolus;Oxytocin discontinued;Reposition;Provider notified     RNs at bedside doing side lying release and shaking apples with pt. No audible decreases, only noise from movement picking up during shaking apples. HR at baseline in between shaking apple movements.

## 2023-08-14 NOTE — PLAN OF CARE
Goal Outcome Evaluation:      Plan of Care Reviewed With: patient    Overall Patient Progress: no changeOverall Patient Progress: no change     Pt admitted for IOL. RN assumed care at 1900. VSS, EFM charted (see flowsheet), pt afebrile. Pt denies headache, vision changes, RUQ pain, bleeding. Anticipate . Pt stated she has no questions or concerns with plan of care at this time. Call light within reach. Report given to Rosemary MURRAY.

## 2023-08-14 NOTE — L&D DELIVERY NOTE
OB Vaginal Delivery Note    Courtney Churchill MRN# [id number]   Age: 27 year old YOB: 1995     Brief labor course: Courtney came for an induction of labor s/t class 3 obesity and hx of fetal growth restrictions with abnormal dopplers this pregnancy. Her cervix was ripened with serial doses of misoprostol and then pitocin was started and AROM was done. Her labor progressed slowly r/t iv infultration and needing to restart pitocin at 0. She got to complete and had a short pushing stage to deliver a baby girl with a lusty cry. The baby was placed skin to skin with courtney and her whole family was so happy to meet her. The placenta delivered rae and a left labial lac was repaired.     GA: 39w2d  GP:   Labor Complications: None   EBL:   mL  Delivery QBL:    Delivery Type: Vaginal, Spontaneous   ROM to Delivery Time: (Delivered) Hours: 19 Minutes: 3   Weight: 2.82 kg (6 lb 3.5 oz)    1 Minute 5 Minute 10 Minute   Apgar Totals: 9   9             Delivery Details:  Courtney Churchill, a 27 year old  female delivered a viable infant with apgars of 9  and 9 . Patient was fully dilated and pushing after 12  hours 41  minutes in active labor. Delivery was via vaginal, spontaneous  to a sterile field under epidural  anesthesia. Infant delivered in vertex    occiput  anterior  position. Anterior and posterior shoulders delivered without difficulty. The cord was clamped, cut twice and 3 vessels  were noted. Cord blood was obtained in routine fashion with the following disposition: lab .      Cord complications: none   Placenta delivered at  . Placental disposition was Hospital disposal . Fundal massage performed and fundus found to be firm.     Episiotomy: none    Perineum, vagina, cervix were inspected, and the following lacerations were noted:   Perineal lacerations: none     labial laceration: left           Any lacerations were repaired in the usual fashion using 4-0  "vicryl.    Excellent hemostasis was noted. Needle count correct. Infant and patient in delivery room in good and stable condition.        Kosta Mart-Sarah [[id number]]      Labor Event Times      Latent labor onset date/time: 2023 123    Active labor onset date: 23 Onset time:  6:04 PM CDT   Dilation complete date: 23 Complete time:  6:45 AM   Start pushing date/time: 2023 0726          Labor Length      1st Stage (hrs): 12 (min): 41   2nd Stage (hrs): 0 (min): 53          Labor Events     labor?: No   steroids: None  Labor Type: Induction/Cervical ripening  Predominate monitoring during 1st stage: continuous electronic fetal monitoring       Rupture date/time: 23   Rupture type: Artificial Rupture of Membranes  Fluid color: Clear  Fluid odor: Normal     Induction: Misoprostol, Oxytocin, AROM  Induction date/time: 23 0640    Cervical ripening date/time: 23 1203    Indications for induction: Obesity       Delivery/Placenta Date and Time      Delivery Date: 23 Delivery Time:  7:38 AM                 Apgars    Living status: Living   1 Minute 5 Minute 10 Minute 15 Minute 20 Minute   Skin color: 1  1       Heart rate: 2  2       Reflex irritability: 2  2       Muscle tone: 2  2       Respiratory effort: 2  2       Total: 9  9       Apgars assigned by: KATERINA PATEL RN       Cord      Vessels: 3 Vessels    Cord Complications: None               Cord Blood Disposition: Lab    Gases Sent?: No    Delayed cord clamping?: Yes    Cord Clamping Delay (seconds):  seconds    Stem cell collection?: No           [facility] Resuscitation    Methods: None   Care at Delivery: Lusty cry at delivery. Baby to mother's abdomen. Dried, covered, and further stimulated to cry with warm blankets.  Output in Delivery Room: Stool       [facility] Measurements      Weight: 6 lb 3.5 oz Length: 1' 7.5\"     Head circumference: 33 cm    Output in delivery room: Stool   "     Skin to Skin and Feeding Plan      Skin to skin initiation date/time: 1/8/1841    Skin to skin with: Mother  Skin to skin end date/time:            Labor Events and Shoulder Dystocia    Fetal Tracing Prior to Delivery: Category 2  Fetal Tracing Comments: decels in 2nd stage  Shoulder dystocia present?: Neg       Delivery (Maternal) (Provider to Complete) ([id number])    Episiotomy: None  Perineal lacerations: None      Labial laceration: left Repaired?: Yes   Repair suture: 4-0 Vicryl  Genital tract inspection done: Pos       Blood Loss  Mother: Sarah Mart #[id number]     Start of Mother's Information      Delivery Blood Loss  08/13/23 1804 - 08/14/23 0817      None                 End of Mother's Information  Mother: Sarah Mart #[id number]                Delivery - Provider to Complete ([id number])    Delivery Type (Choose the 1 that will go to the Birth History): Vaginal, Spontaneous                                           Placenta    Removal: Spontaneous  Disposition: Hospital disposal             Anesthesia    Method: Epidural  Cervical dilation at placement: 4-7                    Presentation and Position    Presentation: Vertex     Occiput Anterior                     Cassidy Hartmann CNM, APRN QUINCY

## 2023-08-14 NOTE — PROGRESS NOTES
"  S:Patient is comfortable with epidural, resting/sleeping. Sister and partner in room and sleeping. Received a page from RN that IV infiltrated and pitocin was off for about an hour from [id number] due to multiple attempts at getting another IV started. MVUs were adequate just prior to this happening but pitocin was restarted at 2mu/min and they have not been adequate since.     O:  Blood pressure 127/72, pulse 62, temperature 98.3  F (36.8  C), temperature source Oral, resp. rate 16, height 1.549 m (5' 1\"), weight 90.7 kg (200 lb), last menstrual period 2022, SpO2 98 %, not currently breastfeeding.  General appearance: comfortable    CONTRACTIONS: Contractions every 5-6 minutes.  IUPC resting tone - 5 Contraction intensity - 45 MVU - 90  FETAL HEART TONES: baseline 125 with moderate FHR variability and    accelerations yes. Decelerations yes - early.    ROM: clear fluid  PELVIC EXAM: 2cm rim of cervix from 6-12o'clock on the patient's right side; no cervix on the left  Fetal Position:  vertex  Bloody show: No  Pitocin- 6 mu/min.,  Antibiotics- none    Brief Labor Course:  23  1230   Admission to L&D  Five doses of misoprostol, last one @ 2001   SVE 3.5/70%/-2 station      23  0615   start pitocin   1030 SVE unchanged; pit @ 6mu  1230 SVE 4/80/-1; pit @ 14mu, AROM for clear fluid  1800 SVE 6.5/80/-1; pit @ 16mu   SVE 7/80/-1; pit @16, epidural in place, IUPC placed     Assessment:  IUP @ 39w2d  IOL for morbid obesity and hx of FGR in this pregnancy  Cat 1 FHT tracing   ROM x8hrs, afebrile, clear fluid  GBS negative  COVID negative    PLAN:  ===========  Comfort measures prn - continue with position changes to encourage fetal descent  Pain medication - comfortable with epidural  Anticipate   Labor induction with Pitocin; titrate per unit protocol  Reevaluate in 2-4 hours/PRN   Judit Lee, APRN, QUINCY   "

## 2023-08-14 NOTE — PROVIDER NOTIFICATION
08/13/23 2238   Provider Notification   Provider Name/Title Judit Eastman CNM   Method of Notification Electronic Page   Request Evaluate - Remote   Notification Reason Status Update  (IV infiltrated at 2148. Pitocin off since then. IV replaced. Plan to restart Pit per farideh.)     Provider called RN at [phone]. Per provider, ok to restart Pitocin. Plan per provider to titrate pitocin as ordered, and SVE per CNM after 1 hour.

## 2023-08-14 NOTE — ANESTHESIA PROCEDURE NOTES
Epidural catheter Procedure Note    Pre-Procedure   Staff -        Anesthesiologist:  Annette Skaggs MD       Resident/Fellow: Tacos Clements MD       Performed By: resident and anesthesiologist       Location: OB       Procedure Start/Stop Times: 8/13/2023 7:05 PM and 8/13/2023 7:17 PM       Pre-Anesthestic Checklist: patient identified, IV checked, risks and benefits discussed, informed consent, monitors and equipment checked, pre-op evaluation, at physician/surgeon's request and post-op pain management  Timeout:       Correct Patient: Yes        Correct Procedure: Yes        Correct Site: Yes        Correct Position: Yes   Procedure Documentation  Procedure: epidural catheter       Diagnosis: Labor analgesia       Patient Position: sitting       Patient Prep/Sterile Barriers: sterile gloves, mask, patient draped       Skin prep: Chloraprep       Local skin infiltrated with mL of 2% lidocaine.        Insertion Site: L4-5. (midline approach).       Technique: LORT saline        RYAN at 6.5 cm.       Needle Type: Touhy needle       Needle Gauge: 17.        Needle Length (Inches): 3.5        Catheter: 19 G.          Catheter threaded easily.         5.5 cm epidural space.         Threaded 12 cm at skin.         # of attempts: 1 and  # of redirects:  0    Assessment/Narrative         Paresthesias: No.       Test dose of 3 mL lidocaine 1.5% w/ 1:200,000 epinephrine at 07:10 CDT.         Test dose negative, 3 minutes after injection, for signs of intravascular, subdural, or intrathecal injection.       Insertion/Infusion Method: LORT saline       Aspiration negative for Heme or CSF via Epidural Catheter.    Medication(s) Administered   0.125% Bupivacaine + 2 mcg/mL Fentanyl via CADD (Epidural) - EPIDURAL   4 mL - 8/13/2023 7:14:00 PM   4 mL - 8/13/2023 7:17:00 PM  Medication Administration Time: 8/13/2023 7:05 PM      FOR [facility] ([facility]) ONLY:   Pain Team Contact information: please page the Pain Team Via  "[employer]. Search \"Pain\". During daytime hours, please page the attending first. At night please page the resident first.      "

## 2023-08-14 NOTE — PROGRESS NOTES
"  S:Patient sleeping, has been feeling some rectal pressure. Would like cervical exam.     O:  Blood pressure 108/59, pulse 62, temperature 98.4  F (36.9  C), temperature source Oral, resp. rate 16, height 1.549 m (5' 1\"), weight 90.7 kg (200 lb), last menstrual period 2022, SpO2 98 %, not currently breastfeeding.  General appearance: comfortable    CONTRACTIONS: Contractions every 2-6.5 minutes.    IUPC - resting tone 0, contraction strength 40,   FETAL HEART TONES: baseline 135 with moderate FHR variability and    accelerations yes. Decelerations no.    ROM: clear fluid  PELVIC EXAM: rim of cervix slightly thinner (a little over 1cm) from 6 to 12 o'clock but still present on the entire right side  Fetal Position:  vertex  Bloody show: Yes   Pitocin- 12 mu/min.,  Antibiotics- none    Brief Labor Course:  23  1230   Admission to L&D  Five doses of misoprostol, last one @ 2001   SVE 3.5/70%/-2 station      23  0615   start pitocin   1030 SVE unchanged; pit @ 6mu  1230 SVE 4/80/-1; pit @ 14mu, AROM for clear fluid  1800 SVE 6.5/80/-1; pit @ 16mu  2010 SVE 7/80/-1; pit @16, epidural in place, IUPC placed     23  0030 SVE thick rim of cervix along right side (2cm)  0400 SVE 1cm rim of cervix along right side    Assessment:  IUP @ 39w2d  IOL for morbid obesity and hx of FGR in this pregnancy  Cat 1 FHT tracing   ROM x16hrs, clear fluid, afebrile, most recent temp 98.4  GBS negative  COVID negative    PLAN:  ===========  Comfort measures prn - recommend right side positioning given persistent cervical lip  Pain medication - comfortable with epidural, pt states pressures isn't bothering her. Discussed sitting up in chair position and using PCEA button prn  Anticipate   Labor induction with Pitocin; titrate per unit protocol  Reevaluate in 2-4 hours/PRN   Judit Lee, APRN, CNM   "

## 2023-08-14 NOTE — PLAN OF CARE
Goal Outcome Evaluation:      Plan of Care Reviewed With: patient      Data: VSS and postpartum checks WNL. Patient eating and drinking normally. Patient able to empty bladder independently and up ambulating. Patient performing self care and able to care for infant.Fundus at U U and firm  without massage.  lochia scant and  no blood clots.   Action: Patient taking Tylenol for pain and pain tolerable. Encouraged patient to breast feed every 2 - 3 hours and to monitor for cues to feed infant. Patient education done( education record).   Response: Patient participating in infant's care. Positive attachment with infant observed. Support/ spouse and family present at bedside and attentive to infant and patient.   Plan: Continue with the plan of cares.

## 2023-08-14 NOTE — PROGRESS NOTES
"  S:Patient comfortable with epidural, resting in bed. A few family members at bedside. Discussed placing IUPC for accurate monitoring of uterine activity which will allow for position changes and more effective management of the pitocin. Pt agrees to this.     O:  Blood pressure 109/57, pulse 62, temperature 98.5  F (36.9  C), temperature source Oral, resp. rate 16, height 1.549 m (5' 1\"), weight 90.7 kg (200 lb), last menstrual period 2022, SpO2 99 %, not currently breastfeeding.  General appearance: comfortable    CONTRACTIONS: Contractions every 2-3 minutes.  Palpate: moderate  FETAL HEART TONES: baseline 125 with moderate FHR variability and    accelerations yes. Decelerations no.    ROM: clear fluid  PELVIC EXAM:780/-1; minimal change from last exam, difficult to tell if cervix slightly more dilated or position of fetal vertex making it feel that way  Fetal Position:  vertex  Bloody show: No  Pitocin- 16 mu/min.,  Antibiotics- none    Brief Labor Course:  23  1230   Admission to L&D  Five doses of misoprostol, last one @ 2001   SVE 3.5/70%/-2 station      23  0615   start pitocin   1030 SVE unchanged; pit @ 6mu  1230 SVE 4/80/-1; pit @ 14mu, AROM for clear fluid  1800 SVE 6.5/80/-1; pit @ 16mu  2010 SVE 7/80/-1; pit @16, epidural in place, IUPC placed     Assessment:  IUP @ 39w1d  IOL for morbid obesity and hx of FGR in this pregnancy  Cat 1 FHT tracing   ROM x8hrs, afebrile, clear fluid  GBS negative  COVID negative    PLAN:  ===========  Comfort measures prn - continue with position changes to facilitate fetal descent  Pain medication - comfortable with epidural   Anticipate   Labor induction with Pitocin; titrate per unit protocol  Reevaluate in 2-4 hours/PRN   Judit Lee, DARRYL, QUINCY   "

## 2023-08-14 NOTE — PLAN OF CARE
Data: Sarah Churchill transferred to [id number] via wheelchair at 0943. Baby transferred via parent's arms.  Action: Receiving unit notified of transfer: Yes. Patient and family notified of room change. Report given to TERRI Solano at 0856. Belongings sent to receiving unit. Accompanied by Registered Nurse. Oriented patient to surroundings. Call light within reach. ID bands double-checked with receiving RN.  Response: Patient tolerated transfer and is stable.

## 2023-08-14 NOTE — PLAN OF CARE
Pt VSS, afebrile. Pt denies HA, blurry vision, SOB and RUQ pain. IOL hx FGR 10% and BMI 38. Pitocin infusing, RN titrating per protocol, see MAR. SVE 4/80/-1 and AROM clear fluid@ 1235. SVE @ 1804 6.5/80/-1. Nausea medications administered, see MAR. Pt coping with pain via IV fentanyl, warm bath and support persons. Support persons attentive at the bedside. Epidural requested @ 6953. Pt resting comfortably with epidural. Report given to TERRI Bliss and TERRI Bazan.

## 2023-08-14 NOTE — PROGRESS NOTES
"  S:Patient is comfortable but feeling increased rectal pressure more frequently now.    O:  Blood pressure 132/78, pulse 62, temperature 98.3  F (36.8  C), temperature source Oral, resp. rate 16, height 1.549 m (5' 1\"), weight 90.7 kg (200 lb), last menstrual period 2022, SpO2 96 %, not currently breastfeeding.  General appearance: comfortable    CONTRACTIONS: Contractions every 1.5-3.5 minutes.    IUPC - resting tone - 0, contraction strength - 35, MVU - 135  FETAL HEART TONES: baseline 140 with moderate FHR variability and    accelerations no. Decelerations yes - early and variable.    ROM: clear fluid  PELVIC EXAM:C/C/+2  Fetal Position:  vertex  Bloody show: Yes   Pitocin- 16 mu/min.,  Antibiotics- none    Brief Labor Course:  23  1230   Admission to L&D  Five doses of misoprostol, last one @ 2001   SVE 3.5/70%/-2 station      23  0615   start pitocin   1030 SVE unchanged; pit @ 6mu  1230 SVE 4/80/-1; pit @ 14mu, AROM for clear fluid  1800 SVE 6.5/80/-1; pit @ 16mu  2010 SVE 7/80/-1; pit @16, epidural in place, IUPC placed     23  0030 SVE thick rim of cervix along right side (2cm)  0400 SVE 1cm rim of cervix along right side  0645 SVE C/C/+2     Assessment:  IUP @ 39w2d  IOL for morbid obesity and hx of FGR in this pregnancy  Cat II FHT tracing   ROM x18.5 hrs, clear fluid, afebrile, most recent temp 98.4  GBS negative  COVID negative    PLAN:  ===========  Prepare for second stage - patient would like to wait until her parents get here to start pushing. They are on the way at this time.  Anticipate DARRYL Jacobson, QUINCY   "

## 2023-08-15 VITALS
TEMPERATURE: 98.1 F | DIASTOLIC BLOOD PRESSURE: 66 MMHG | HEIGHT: 61 IN | SYSTOLIC BLOOD PRESSURE: 116 MMHG | WEIGHT: 200 LBS | OXYGEN SATURATION: 99 % | HEART RATE: 66 BPM | BODY MASS INDEX: 37.76 KG/M2 | RESPIRATION RATE: 18 BRPM

## 2023-08-15 LAB — HGB BLD-MCNC: 9.2 G/DL (ref 11.7–15.7)

## 2023-08-15 PROCEDURE — 36416 COLLJ CAPILLARY BLOOD SPEC: CPT | Performed by: ADVANCED PRACTICE MIDWIFE

## 2023-08-15 PROCEDURE — 85018 HEMOGLOBIN: CPT | Performed by: ADVANCED PRACTICE MIDWIFE

## 2023-08-15 PROCEDURE — 250N000013 HC RX MED GY IP 250 OP 250 PS 637: Performed by: ADVANCED PRACTICE MIDWIFE

## 2023-08-15 RX ORDER — B-COMPLEX WITH VITAMIN C
1 TABLET ORAL DAILY
Qty: 100 TABLET | Refills: 0 | Status: SHIPPED | OUTPATIENT
Start: 2023-08-15

## 2023-08-15 RX ORDER — ACETAMINOPHEN 325 MG/1
650 TABLET ORAL EVERY 6 HOURS PRN
Qty: 100 TABLET | Refills: 0 | Status: SHIPPED | OUTPATIENT
Start: 2023-08-15

## 2023-08-15 RX ORDER — IBUPROFEN 600 MG/1
600 TABLET, FILM COATED ORAL EVERY 6 HOURS PRN
Qty: 60 TABLET | Refills: 0 | Status: SHIPPED | OUTPATIENT
Start: 2023-08-15

## 2023-08-15 RX ORDER — MULTIVIT WITH MINERALS/LUTEIN
250 TABLET ORAL EVERY OTHER DAY
Qty: 30 TABLET | Refills: 0 | Status: SHIPPED | OUTPATIENT
Start: 2023-08-15

## 2023-08-15 RX ORDER — AMOXICILLIN 250 MG
1 CAPSULE ORAL DAILY
Qty: 100 TABLET | Refills: 0 | Status: SHIPPED | OUTPATIENT
Start: 2023-08-15

## 2023-08-15 RX ORDER — LANOLIN ALCOHOL/MO/W.PET/CERES
1000 CREAM (GRAM) TOPICAL EVERY OTHER DAY
Qty: 30 TABLET | Refills: 0 | Status: SHIPPED | OUTPATIENT
Start: 2023-08-15

## 2023-08-15 RX ADMIN — IBUPROFEN 800 MG: 800 TABLET, FILM COATED ORAL at 00:17

## 2023-08-15 RX ADMIN — IBUPROFEN 800 MG: 800 TABLET, FILM COATED ORAL at 09:03

## 2023-08-15 RX ADMIN — DOCUSATE SODIUM 100 MG: 100 CAPSULE, LIQUID FILLED ORAL at 09:03

## 2023-08-15 ASSESSMENT — ACTIVITIES OF DAILY LIVING (ADL)
ADLS_ACUITY_SCORE: 20

## 2023-08-15 NOTE — PLAN OF CARE
Goal Outcome Evaluation:    Vital signs stable. Postpartum assessment WDL. Pain controlled with Ibuprofen. Patient voiding without difficulty. Tolerating regular diet and fluids. Breastfeeding on cue with minimal assist. Using nipple shield with breastfeeding. Patient and infant bonding well. Will continue with current plan of care.        Plan of Care Reviewed With: patient  Overall Patient Progress: improving

## 2023-08-15 NOTE — DISCHARGE INSTRUCTIONS

## 2023-08-15 NOTE — DISCHARGE SUMMARY
[facility]    Discharge Summary  Obstetrics    Date of Admission:  2023  Date of Discharge:  8/15/2023  Discharging Provider: DARRYL Davis CNM    Discharge Diagnoses   (O80)  (normal spontaneous vaginal delivery)  (primary encounter diagnosis)  Plan: Prenatal Multivit-Min-Fe-FA (PRENATAL, W/IRON &        FA,) 27-0.8 MG TABS, acetaminophen (TYLENOL)         325 MG tablet, ibuprofen (ADVIL/MOTRIN) 600 MG         tablet, senna-docusate (SENOKOT-S/PERICOLACE)         8.6-50 MG tablet, cyanocobalamin (VITAMIN B-12)        1000 MCG tablet, vitamin C (ASCORBIC ACID) 250         MG tablet, Postpartum Home Care Referral    (Z39.1) Breast feeding status of mother  Plan: Breast pump - Manual/Electric    History of Present Illness   Sarah Churchill is a 27 year old female who presented with IOL secondary to BMI and history of growth restriction during this pregnancy. She progressed well through her IOL to delivery of baby girl. She had no complications during labor or delivery. Her only complication postpartum was anemia although she did not have any symptoms or treatment related to this. She has iron at home and will start taking this. RX for Vit C and B12 were given to take with the iron. Discussed symptoms of anemia and option for IV iron if she desires. She had trouble with two different IVs so at this time she declines getting a third IV placed for IV iron. They otherwise are doing well and have no concerns. Good questions about general baby and postpartum care answered. They are excited to go home later today. Discharge instructions reviewed. Discharge meds reviewed and ordered. Discussed birth control options. They are unsure what they will use. No other questions or concerns at this time.     Hospital Course   The patient's hospital course was unremarkable except noted above.  She recovered as anticipated and experienced no post-delivery  complications. On discharge, her pain was well controlled. Vaginal bleeding is similar to peak menstrual flow.  Voiding without difficulty.  Ambulating well and tolerating a normal diet.  No fevers.  Breastfeeding well.  Infant is stable.  She was discharged on post-partum day #1.    Post-partum hemoglobin:   Hemoglobin   Date Value Ref Range Status   08/15/2023 9.2 (L) 11.7 - 15.7 g/dL Final       [place] Depression Scale  Thoughts of Harming Self: Never  Total Score: 2      ADRRYL Davis CNM    Discharge Disposition   Discharged to home   Condition at discharge: Stable    Primary Care Physician   Physician No Ref-Primary    Consultations This Hospital Stay   LACTATION IP CONSULT  ANESTHESIOLOGY IP CONSULT    Discharge Orders      Postpartum Home Care Referral      Activity    Activity as tolerated     Reason for your hospital stay    Maternity care     Follow Up    Follow up with provider in 2 weeks and 6 weeks for post-delivery checks     Breast pump - Manual/Electric    Breast Pump Documentation:  Manual/Electric Pump: To support adequate breast milk production and nutrition for infant.     I, the undersigned, certify that the above prescribed supplies are medically necessary for this patient and is both reasonable and necessary in reference to accepted standards of medical and necessary in reference to accepted standards of medical practice in the treatment of this patient's condition and is not prescribed as a convenience.     Diet    Resume previous diet     Discharge Medications   Current Discharge Medication List        START taking these medications    Details   acetaminophen (TYLENOL) 325 MG tablet Take 2 tablets (650 mg) by mouth every 6 hours as needed for mild pain Start after Delivery.  Qty: 100 tablet, Refills: 0    Associated Diagnoses:  (normal spontaneous vaginal delivery)      cyanocobalamin (VITAMIN B-12) 1000 MCG tablet Take 1 tablet (1,000 mcg) by mouth every other day Take  with iron  Qty: 30 tablet, Refills: 0    Associated Diagnoses:  (normal spontaneous vaginal delivery)      ibuprofen (ADVIL/MOTRIN) 600 MG tablet Take 1 tablet (600 mg) by mouth every 6 hours as needed for moderate pain Start after delivery  Qty: 60 tablet, Refills: 0    Associated Diagnoses:  (normal spontaneous vaginal delivery)      senna-docusate (SENOKOT-S/PERICOLACE) 8.6-50 MG tablet Take 1 tablet by mouth daily Start after delivery.  Qty: 100 tablet, Refills: 0    Associated Diagnoses:  (normal spontaneous vaginal delivery)      vitamin C (ASCORBIC ACID) 250 MG tablet Take 1 tablet (250 mg) by mouth every other day Take with iron  Qty: 30 tablet, Refills: 0    Associated Diagnoses:  (normal spontaneous vaginal delivery)           CONTINUE these medications which have CHANGED    Details   Prenatal Multivit-Min-Fe-FA (PRENATAL, W/IRON & FA,) 27-0.8 MG TABS Take 1 tablet by mouth daily  Qty: 100 tablet, Refills: 0    Associated Diagnoses:  (normal spontaneous vaginal delivery)           CONTINUE these medications which have NOT CHANGED    Details   vitamin D3 (CHOLECALCIFEROL) 50 mcg (2000 units) tablet Take 1 tablet (50 mcg) by mouth daily  Qty: 90 tablet, Refills: 3    Associated Diagnoses: Vitamin D deficiency           STOP taking these medications       ASPIRIN LOW DOSE 81 MG EC tablet Comments:   Reason for Stopping:         famotidine (PEPCID) 20 MG tablet Comments:   Reason for Stopping:             Allergies   No Known Allergies

## 2023-08-15 NOTE — PLAN OF CARE
Goal Outcome Evaluation:      Plan of Care Reviewed With: patient  Discharge instructions read and explained to patient, all questions answered. breat pump given and  Medications will be picked up from [facility] pharmacy.  Patient discharged to home with all belongings.

## 2023-08-15 NOTE — PLAN OF CARE
Goal Outcome Evaluation:      Plan of Care Reviewed With: patient  Data: VSS and postpartum checks WNL. Patient eating and drinking normally. Patient able to empty bladder independently and up ambulating. Patient performing self care and able to care for infant.  Fundus at U U and firm  without massage.  lochia scant and  no blood clots.   Action: Patient taking  Ibuprofen and Tucks pads for pain and pain tolerable. Encouraged patient to breast feed every 2 - 3 hours and to monitor for cues to feed infant. Patient education done( education record).   Response: Patient participating in infant's care. Positive attachment with infant observed with infant. Support/ spouse and family  present at bedside and attentive to infant and patient.   Plan: Continue with the plan of cares.

## 2023-08-28 ENCOUNTER — APPOINTMENT (OUTPATIENT)
Dept: ULTRASOUND IMAGING | Facility: CLINIC | Age: 28
End: 2023-08-28
Attending: STUDENT IN AN ORGANIZED HEALTH CARE EDUCATION/TRAINING PROGRAM
Payer: COMMERCIAL

## 2023-08-28 ENCOUNTER — HOSPITAL ENCOUNTER (EMERGENCY)
Facility: CLINIC | Age: 28
Discharge: HOME | End: 2023-08-28
Attending: STUDENT IN AN ORGANIZED HEALTH CARE EDUCATION/TRAINING PROGRAM | Admitting: STUDENT IN AN ORGANIZED HEALTH CARE EDUCATION/TRAINING PROGRAM
Payer: COMMERCIAL

## 2023-08-28 VITALS
DIASTOLIC BLOOD PRESSURE: 67 MMHG | SYSTOLIC BLOOD PRESSURE: 111 MMHG | TEMPERATURE: 98.5 F | BODY MASS INDEX: 39.27 KG/M2 | HEART RATE: 68 BPM | RESPIRATION RATE: 18 BRPM | WEIGHT: 200 LBS | HEIGHT: 60 IN | OXYGEN SATURATION: 99 %

## 2023-08-28 DIAGNOSIS — K80.20 SYMPTOMATIC CHOLELITHIASIS: ICD-10-CM

## 2023-08-28 LAB
ALBUMIN SERPL BCG-MCNC: 3.2 G/DL (ref 3.5–5.2)
ALBUMIN UR-MCNC: 20 MG/DL
ALP SERPL-CCNC: 90 U/L (ref 35–104)
ALT SERPL W P-5'-P-CCNC: 21 U/L (ref 0–50)
ANION GAP SERPL CALCULATED.3IONS-SCNC: 12 MMOL/L (ref 7–15)
APPEARANCE UR: CLEAR
AST SERPL W P-5'-P-CCNC: 42 U/L (ref 0–45)
BASOPHILS # BLD AUTO: 0 10E3/UL (ref 0–0.2)
BASOPHILS NFR BLD AUTO: 0 %
BILIRUB SERPL-MCNC: 0.3 MG/DL
BILIRUB UR QL STRIP: NEGATIVE
BUN SERPL-MCNC: 18.4 MG/DL (ref 6–20)
CALCIUM SERPL-MCNC: 8.1 MG/DL (ref 8.6–10)
CHLORIDE SERPL-SCNC: 109 MMOL/L (ref 98–107)
COLOR UR AUTO: YELLOW
CREAT SERPL-MCNC: 0.58 MG/DL (ref 0.51–0.95)
DEPRECATED HCO3 PLAS-SCNC: 21 MMOL/L (ref 22–29)
EOSINOPHIL # BLD AUTO: 0.1 10E3/UL (ref 0–0.7)
EOSINOPHIL NFR BLD AUTO: 0 %
ERYTHROCYTE [DISTWIDTH] IN BLOOD BY AUTOMATED COUNT: 12.8 % (ref 10–15)
GFR SERPL CREATININE-BSD FRML MDRD: >90 ML/MIN/1.73M2
GLUCOSE SERPL-MCNC: 103 MG/DL (ref 70–99)
GLUCOSE UR STRIP-MCNC: NEGATIVE MG/DL
HCT VFR BLD AUTO: 33.8 % (ref 35–47)
HGB BLD-MCNC: 11.2 G/DL (ref 11.7–15.7)
HGB UR QL STRIP: NEGATIVE
IMM GRANULOCYTES # BLD: 0.1 10E3/UL
IMM GRANULOCYTES NFR BLD: 1 %
KETONES UR STRIP-MCNC: NEGATIVE MG/DL
LEUKOCYTE ESTERASE UR QL STRIP: NEGATIVE
LIPASE SERPL-CCNC: 44 U/L (ref 13–60)
LYMPHOCYTES # BLD AUTO: 1.9 10E3/UL (ref 0.8–5.3)
LYMPHOCYTES NFR BLD AUTO: 12 %
MCH RBC QN AUTO: 30.7 PG (ref 26.5–33)
MCHC RBC AUTO-ENTMCNC: 33.1 G/DL (ref 31.5–36.5)
MCV RBC AUTO: 93 FL (ref 78–100)
MONOCYTES # BLD AUTO: 0.8 10E3/UL (ref 0–1.3)
MONOCYTES NFR BLD AUTO: 5 %
MUCOUS THREADS #/AREA URNS LPF: PRESENT /LPF
NEUTROPHILS # BLD AUTO: 12.7 10E3/UL (ref 1.6–8.3)
NEUTROPHILS NFR BLD AUTO: 82 %
NITRATE UR QL: NEGATIVE
NRBC # BLD AUTO: 0 10E3/UL
NRBC BLD AUTO-RTO: 0 /100
PH UR STRIP: 6 [PH] (ref 5–7)
PLATELET # BLD AUTO: 336 10E3/UL (ref 150–450)
POTASSIUM SERPL-SCNC: 3.8 MMOL/L (ref 3.4–5.3)
PROT SERPL-MCNC: 5.7 G/DL (ref 6.4–8.3)
RBC # BLD AUTO: 3.65 10E6/UL (ref 3.8–5.2)
RBC URINE: <1 /HPF
SODIUM SERPL-SCNC: 142 MMOL/L (ref 136–145)
SP GR UR STRIP: 1.03 (ref 1–1.03)
SQUAMOUS EPITHELIAL: <1 /HPF
UROBILINOGEN UR STRIP-MCNC: 4 MG/DL
WBC # BLD AUTO: 15.5 10E3/UL (ref 4–11)
WBC URINE: 4 /HPF

## 2023-08-28 PROCEDURE — 76705 ECHO EXAM OF ABDOMEN: CPT

## 2023-08-28 PROCEDURE — 96375 TX/PRO/DX INJ NEW DRUG ADDON: CPT | Performed by: STUDENT IN AN ORGANIZED HEALTH CARE EDUCATION/TRAINING PROGRAM

## 2023-08-28 PROCEDURE — 93010 ELECTROCARDIOGRAM REPORT: CPT | Performed by: STUDENT IN AN ORGANIZED HEALTH CARE EDUCATION/TRAINING PROGRAM

## 2023-08-28 PROCEDURE — 83690 ASSAY OF LIPASE: CPT | Performed by: STUDENT IN AN ORGANIZED HEALTH CARE EDUCATION/TRAINING PROGRAM

## 2023-08-28 PROCEDURE — 96374 THER/PROPH/DIAG INJ IV PUSH: CPT | Performed by: STUDENT IN AN ORGANIZED HEALTH CARE EDUCATION/TRAINING PROGRAM

## 2023-08-28 PROCEDURE — 36415 COLL VENOUS BLD VENIPUNCTURE: CPT | Performed by: STUDENT IN AN ORGANIZED HEALTH CARE EDUCATION/TRAINING PROGRAM

## 2023-08-28 PROCEDURE — 93005 ELECTROCARDIOGRAM TRACING: CPT | Performed by: STUDENT IN AN ORGANIZED HEALTH CARE EDUCATION/TRAINING PROGRAM

## 2023-08-28 PROCEDURE — 99285 EMERGENCY DEPT VISIT HI MDM: CPT | Mod: 25 | Performed by: STUDENT IN AN ORGANIZED HEALTH CARE EDUCATION/TRAINING PROGRAM

## 2023-08-28 PROCEDURE — 80053 COMPREHEN METABOLIC PANEL: CPT | Performed by: STUDENT IN AN ORGANIZED HEALTH CARE EDUCATION/TRAINING PROGRAM

## 2023-08-28 PROCEDURE — 81001 URINALYSIS AUTO W/SCOPE: CPT | Performed by: STUDENT IN AN ORGANIZED HEALTH CARE EDUCATION/TRAINING PROGRAM

## 2023-08-28 PROCEDURE — 250N000011 HC RX IP 250 OP 636: Mod: JZ | Performed by: STUDENT IN AN ORGANIZED HEALTH CARE EDUCATION/TRAINING PROGRAM

## 2023-08-28 PROCEDURE — 85025 COMPLETE CBC W/AUTO DIFF WBC: CPT | Performed by: STUDENT IN AN ORGANIZED HEALTH CARE EDUCATION/TRAINING PROGRAM

## 2023-08-28 RX ORDER — OXYCODONE HYDROCHLORIDE 5 MG/1
5 TABLET ORAL EVERY 6 HOURS PRN
Qty: 10 TABLET | Refills: 0 | Status: SHIPPED | OUTPATIENT
Start: 2023-08-28 | End: 2023-08-31

## 2023-08-28 RX ORDER — ONDANSETRON 2 MG/ML
4 INJECTION INTRAMUSCULAR; INTRAVENOUS ONCE
Status: COMPLETED | OUTPATIENT
Start: 2023-08-28 | End: 2023-08-28

## 2023-08-28 RX ORDER — HYDROMORPHONE HYDROCHLORIDE 1 MG/ML
0.5 INJECTION, SOLUTION INTRAMUSCULAR; INTRAVENOUS; SUBCUTANEOUS ONCE
Status: COMPLETED | OUTPATIENT
Start: 2023-08-28 | End: 2023-08-28

## 2023-08-28 RX ORDER — ONDANSETRON 4 MG/1
4 TABLET, ORALLY DISINTEGRATING ORAL EVERY 8 HOURS PRN
Qty: 20 TABLET | Refills: 0 | Status: SHIPPED | OUTPATIENT
Start: 2023-08-28

## 2023-08-28 RX ORDER — KETOROLAC TROMETHAMINE 15 MG/ML
15 INJECTION, SOLUTION INTRAMUSCULAR; INTRAVENOUS ONCE
Status: COMPLETED | OUTPATIENT
Start: 2023-08-28 | End: 2023-08-28

## 2023-08-28 RX ADMIN — KETOROLAC TROMETHAMINE 15 MG: 15 INJECTION, SOLUTION INTRAMUSCULAR; INTRAVENOUS at 20:24

## 2023-08-28 RX ADMIN — HYDROMORPHONE HYDROCHLORIDE 0.5 MG: 1 INJECTION, SOLUTION INTRAMUSCULAR; INTRAVENOUS; SUBCUTANEOUS at 20:44

## 2023-08-28 RX ADMIN — ONDANSETRON 4 MG: 2 INJECTION INTRAMUSCULAR; INTRAVENOUS at 20:25

## 2023-08-28 ASSESSMENT — ACTIVITIES OF DAILY LIVING (ADL)
ADLS_ACUITY_SCORE: 35
ADLS_ACUITY_SCORE: 35

## 2023-08-28 NOTE — ED PROVIDER NOTES
ED Provider Note  [facility]      History     Chief Complaint   Patient presents with     Abdominal Pain     Upper gastric pain. Post partum. Had baby two weeks ago. Darlene delivery, no complications. No bleeding,no loss stool or fever. R shoulder pain with minimal chest pain.      RETA Churchill is a 27 year old female with PMH of  without complication on  who now presents to the ED BIBA with severe postpartum upper epigastric pain that she rates as 10/10, as well as right shoulder and chest pain. EMS gave her 100 of fentanyl.  Reports uncomplicated delivery 2 weeks ago without any history of gestational hypertension.  She has had associated nausea and vomiting.  No reported fevers, chills or other infectious symptoms.  No chest pain or difficulty breathing.  Minimal vaginal bleeding that is largely resolved since her delivery.    Past Medical History  No past medical history on file.  No past surgical history on file.  acetaminophen (TYLENOL) 325 MG tablet  cyanocobalamin (VITAMIN B-12) 1000 MCG tablet  ibuprofen (ADVIL/MOTRIN) 600 MG tablet  ondansetron (ZOFRAN ODT) 4 MG ODT tab  oxyCODONE (ROXICODONE) 5 MG tablet  Prenatal Multivit-Min-Fe-FA (PRENATAL, W/IRON & FA,) 27-0.8 MG TABS  senna-docusate (SENOKOT-S/PERICOLACE) 8.6-50 MG tablet  vitamin C (ASCORBIC ACID) 250 MG tablet  vitamin D3 (CHOLECALCIFEROL) 50 mcg (2000 units) tablet      No Known Allergies  Family History  Family History   Problem Relation Age of Onset     Hypertension Mother      Social History   Social History     Tobacco Use     Smoking status: Former     Packs/day: 0.00     Years: 3.00     Pack years: 0.00     Types: Cigarettes     Quit date: 2022     Years since quittin.6     Smokeless tobacco: Never     Tobacco comments:     2 cigarettes/day   Vaping Use     Vaping Use: Never used   Substance Use Topics     Alcohol use: Not Currently     Drug use: No      Past medical history, past  "surgical history, medications, allergies, family history, and social history were reviewed with the patient. No additional pertinent items.      A medically appropriate review of systems was performed with pertinent positives and negatives noted in the HPI, and all other systems negative.    Physical Exam   BP: 92/62  Pulse: 74  Temp: 98.5  F (36.9  C)  Resp: 18  Height: 152.7 cm (5' 0.1\")  Weight: 90.7 kg (200 lb)  SpO2: 99 %  Physical Exam  General: No acute distress. Appears stated age.   HENT: MMM, no oropharyngeal lesions  Eyes: PERRL, normal sclerae   Cardio: Regular rate, extremities well perfused  Resp: Normal work of breathing, normal respiratory rate  Abd: Tenderness to palpation RUQ, no rebound or guarding  Neuro: alert and fully oriented. CN II-XII grossly intact. Grossly normal strength and sensation in all extremities.   MSK: no deformities. Grossly normal ROM in extremities.   Integumentary/Skin: no rash, normal color  Psych: normal affect, normal behavior    ED Course, Procedures, & Data      Procedures            EKG Interpretation:      Interpreted by Mone Ruffin MD  Time reviewed: 18:30  Symptoms at time of EKG: RUQ pain   Rhythm: normal sinus   Rate: normal  Axis: normal  Ectopy: none  Conduction: normal  ST Segments/ T Waves: No ST-T wave changes  Q Waves: none  Comparison to prior: No old EKG available    Clinical Impression: normal EKG                 Results for orders placed or performed during the hospital encounter of 08/28/23   Abdomen US, limited (RUQ only)     Status: None    Narrative    EXAM: US ABDOMEN LIMITED  LOCATION: [facility]  DATE: 8/28/2023    INDICATION: Right upper quadrant pain.  COMPARISON: None available.  TECHNIQUE: Limited abdominal ultrasound.    FINDINGS:    GALLBLADDER: Gallstones in an otherwise normal gallbladder. No wall thickening, or pericholecystic fluid. Negative sonographic Hartman's sign.    BILE DUCTS: No " biliary dilatation. The common duct measures 4 mm.    LIVER: Normal parenchyma with smooth contour. No focal mass.    RIGHT KIDNEY: No hydronephrosis.    PANCREAS: The visualized portions are normal.    No ascites.      Impression    IMPRESSION:    Cholelithiasis, without sonographic evidence of acute cholecystitis.   Comprehensive metabolic panel     Status: Abnormal   Result Value Ref Range    Sodium 142 136 - 145 mmol/L    Potassium 3.8 3.4 - 5.3 mmol/L    Chloride 109 (H) 98 - 107 mmol/L    Carbon Dioxide (CO2) 21 (L) 22 - 29 mmol/L    Anion Gap 12 7 - 15 mmol/L    Urea Nitrogen 18.4 6.0 - 20.0 mg/dL    Creatinine 0.58 0.51 - 0.95 mg/dL    Calcium 8.1 (L) 8.6 - 10.0 mg/dL    Glucose 103 (H) 70 - 99 mg/dL    Alkaline Phosphatase 90 35 - 104 U/L    AST 42 0 - 45 U/L    ALT 21 0 - 50 U/L    Protein Total 5.7 (L) 6.4 - 8.3 g/dL    Albumin 3.2 (L) 3.5 - 5.2 g/dL    Bilirubin Total 0.3 <=1.2 mg/dL    GFR Estimate >90 >60 mL/min/1.73m2   Lipase     Status: Normal   Result Value Ref Range    Lipase 44 13 - 60 U/L   UA with Microscopic reflex to Culture     Status: Abnormal    Specimen: Urine, Clean Catch   Result Value Ref Range    Color Urine Yellow Colorless, Straw, Light Yellow, Yellow    Appearance Urine Clear Clear    Glucose Urine Negative Negative mg/dL    Bilirubin Urine Negative Negative    Ketones Urine Negative Negative mg/dL    Specific Gravity Urine 1.029 1.003 - 1.035    Blood Urine Negative Negative    pH Urine 6.0 5.0 - 7.0    Protein Albumin Urine 20 (A) Negative mg/dL    Urobilinogen Urine 4.0 (A) Normal, 2.0 mg/dL    Nitrite Urine Negative Negative    Leukocyte Esterase Urine Negative Negative    Mucus Urine Present (A) None Seen /LPF    RBC Urine <1 <=2 /HPF    WBC Urine 4 <=5 /HPF    Squamous Epithelials Urine <1 <=1 /HPF    Narrative    Urine Culture not indicated   CBC with platelets and differential     Status: Abnormal   Result Value Ref Range    WBC Count 15.5 (H) 4.0 - 11.0 10e3/uL    RBC  Count 3.65 (L) 3.80 - 5.20 10e6/uL    Hemoglobin 11.2 (L) 11.7 - 15.7 g/dL    Hematocrit 33.8 (L) 35.0 - 47.0 %    MCV 93 78 - 100 fL    MCH 30.7 26.5 - 33.0 pg    MCHC 33.1 31.5 - 36.5 g/dL    RDW 12.8 10.0 - 15.0 %    Platelet Count 336 150 - 450 10e3/uL    % Neutrophils 82 %    % Lymphocytes 12 %    % Monocytes 5 %    % Eosinophils 0 %    % Basophils 0 %    % Immature Granulocytes 1 %    NRBCs per 100 WBC 0 <1 /100    Absolute Neutrophils 12.7 (H) 1.6 - 8.3 10e3/uL    Absolute Lymphocytes 1.9 0.8 - 5.3 10e3/uL    Absolute Monocytes 0.8 0.0 - 1.3 10e3/uL    Absolute Eosinophils 0.1 0.0 - 0.7 10e3/uL    Absolute Basophils 0.0 0.0 - 0.2 10e3/uL    Absolute Immature Granulocytes 0.1 <=0.4 10e3/uL    Absolute NRBCs 0.0 10e3/uL   EKG 12 lead     Status: None   Result Value Ref Range    Systolic Blood Pressure  mmHg    Diastolic Blood Pressure  mmHg    Ventricular Rate 76 BPM    Atrial Rate 76 BPM    CA Interval 146 ms    QRS Duration 84 ms     ms    QTc 427 ms    P Axis 41 degrees    R AXIS 50 degrees    T Axis 13 degrees    Interpretation ECG       Sinus rhythm with sinus arrhythmia  Normal ECG  Unconfirmed report - interpretation of this ECG is computer generated - see medical record for final interpretation    Confirmed by - EMERGENCY ROOM, PHYSICIAN ([id number]),  MISHA FARRELL ([id number]) on 8/29/2023 8:08:30 AM     CBC with platelets differential     Status: Abnormal    Narrative    The following orders were created for panel order CBC with platelets differential.  Procedure                               Abnormality         Status                     ---------                               -----------         ------                     CBC with platelets and d...[[id number]]  Abnormal            Final result                 Please view results for these tests on the individual orders.     Medications   ondansetron (ZOFRAN) injection 4 mg (4 mg Intravenous $Given 8/28/23 2025)   ketorolac (TORADOL) injection 15 mg  (15 mg Intravenous $Given 8/28/23 2024)   HYDROmorphone (PF) (DILAUDID) injection 0.5 mg (0.5 mg Intravenous $Given 8/28/23 2044)     Labs Ordered and Resulted from Time of ED Arrival to Time of ED Departure   COMPREHENSIVE METABOLIC PANEL - Abnormal       Result Value    Sodium 142      Potassium 3.8      Chloride 109 (*)     Carbon Dioxide (CO2) 21 (*)     Anion Gap 12      Urea Nitrogen 18.4      Creatinine 0.58      Calcium 8.1 (*)     Glucose 103 (*)     Alkaline Phosphatase 90      AST 42      ALT 21      Protein Total 5.7 (*)     Albumin 3.2 (*)     Bilirubin Total 0.3      GFR Estimate >90     ROUTINE UA WITH MICROSCOPIC REFLEX TO CULTURE - Abnormal    Color Urine Yellow      Appearance Urine Clear      Glucose Urine Negative      Bilirubin Urine Negative      Ketones Urine Negative      Specific Gravity Urine 1.029      Blood Urine Negative      pH Urine 6.0      Protein Albumin Urine 20 (*)     Urobilinogen Urine 4.0 (*)     Nitrite Urine Negative      Leukocyte Esterase Urine Negative      Mucus Urine Present (*)     RBC Urine <1      WBC Urine 4      Squamous Epithelials Urine <1     CBC WITH PLATELETS AND DIFFERENTIAL - Abnormal    WBC Count 15.5 (*)     RBC Count 3.65 (*)     Hemoglobin 11.2 (*)     Hematocrit 33.8 (*)     MCV 93      MCH 30.7      MCHC 33.1      RDW 12.8      Platelet Count 336      % Neutrophils 82      % Lymphocytes 12      % Monocytes 5      % Eosinophils 0      % Basophils 0      % Immature Granulocytes 1      NRBCs per 100 WBC 0      Absolute Neutrophils 12.7 (*)     Absolute Lymphocytes 1.9      Absolute Monocytes 0.8      Absolute Eosinophils 0.1      Absolute Basophils 0.0      Absolute Immature Granulocytes 0.1      Absolute NRBCs 0.0     LIPASE - Normal    Lipase 44       Abdomen US, limited (RUQ only)   Final Result   IMPRESSION:      Cholelithiasis, without sonographic evidence of acute cholecystitis.             Critical care was not performed.     Medical Decision  Making  The patient's presentation was of high complexity (an acute health issue posing potential threat to life or bodily function).    The patient's evaluation involved:  review of external note(s) from 1 sources (see separate area of note for details)  ordering and/or review of 3+ test(s) in this encounter (see separate area of note for details)  independent interpretation of testing performed by another health professional (see separate area of note for details)    The patient's management necessitated moderate risk (prescription drug management including medications given in the ED), high risk (a parenteral controlled substance) and high risk (a decision regarding hospitalization).      Assessment & Plan    Sarah Churchill is a 27-year-old female approximately 2 weeks postpartum who presents with right upper quadrant abdominal and epigastric pain.  She arrives hemodynamically stable and vital signs within normal limits.  Tenderness to the right upper quadrant.  Work-up initiated with blood work, gallbladder ultrasound and urinalysis.  Treated symptomatically with Toradol, Zofran and Dilaudid.    Symptomatically improved after medications.  Urinalysis clear without signs of infection.  Mild white blood cell count elevation to 15, otherwise unremarkable.  CMP with normal creatinine and electrolytes.  Gallbladder ultrasound reveals cholelithiasis without signs of acute cholecystitis.  No systemic signs of infection.  Pain adequately controlled with medications.  Will plan to discharge home with Zofran and oxycodone.  Close follow-up with surgery for consideration of cholecystectomy.  Discussed return precautions for new or worsening symptoms including fevers, worsening pain and inability to tolerate p.o.    I have reviewed the nursing notes. I have reviewed the findings, diagnosis, plan and need for follow up with the patient.    Discharge Medication List as of 8/28/2023 10:02 PM      START taking these  medications    Details   ondansetron (ZOFRAN ODT) 4 MG ODT tab Take 1 tablet (4 mg) by mouth every 8 hours as needed for nausea, Disp-20 tablet, R-0, Local Print      oxyCODONE (ROXICODONE) 5 MG tablet Take 1 tablet (5 mg) by mouth every 6 hours as needed for pain, Disp-10 tablet, R-0, Local Print             Final diagnoses:   Symptomatic cholelithiasis       Mone Ruffin MD  [facility] EMERGENCY DEPARTMENT  8/28/2023     Mone Ruffin MD  08/31/23 [id number]

## 2023-08-28 NOTE — ED NOTES
Bed: ED03  Expected date: 8/28/23  Expected time: 5:59 PM  Means of arrival:   Comments:  Hen 443   27 F  Severe abd pain  2 wks post partum

## 2023-08-28 NOTE — ED TRIAGE NOTES
Pt brought by EMS due to upper epigastric pain. Rates pain 10/10. Received 100 of fentanyl en route. Pt also claim to have right shoulder and chest pain. Gave birth two weeks ago with no complication.

## 2023-08-29 LAB
ATRIAL RATE - MUSE: 76 BPM
DIASTOLIC BLOOD PRESSURE - MUSE: NORMAL MMHG
INTERPRETATION ECG - MUSE: NORMAL
P AXIS - MUSE: 41 DEGREES
PR INTERVAL - MUSE: 146 MS
QRS DURATION - MUSE: 84 MS
QT - MUSE: 380 MS
QTC - MUSE: 427 MS
R AXIS - MUSE: 50 DEGREES
SYSTOLIC BLOOD PRESSURE - MUSE: NORMAL MMHG
T AXIS - MUSE: 13 DEGREES
VENTRICULAR RATE- MUSE: 76 BPM

## 2023-08-29 NOTE — DISCHARGE INSTRUCTIONS
You were seen today for abdominal pain.  You have gallstones.  Your white blood cell count was a little high, but you do not have a fever and there were no signs of infection on the ultrasound.  Please follow up with the surgery clinic.  Return to ED with new or worsening symptoms such as fever and uncontrolled pain.  Take tylenol for pain and zofran for nausea.  Use oxycodone for breakthrough pain if needed.  Do not give your breastmilk to baby within 12 hours of your dose.  Watch baby for signs of sleepiness.

## 2023-08-29 NOTE — TELEPHONE ENCOUNTER
REFERRAL INFORMATION:  Referring Provider:  Dr. Mone Ruffin   Referring Clinic:   ED  Reason for Visit/Diagnosis: Cholelithiasis       FUTURE VISIT INFORMATION:  Appointment Date: 08-31-23  Appointment Time: @ 2:30pm      NOTES RECORD STATUS  DETAILS   OFFICE NOTE from Referring Provider Internal 08-28-23 Dr. Mone Ruffin    OFFICE NOTE from Other Specialists NO    HOSPITAL DISCHARGE SUMMARY/ ED VISITS  NO    OPERATIVE REPORT NO    ENDOSCOPY (EGD)  NO    PERTINENT LABS Internal CMP: 08-28-23  CBC/diff: 08-28-23, 08-12-23, 02-14-23  BMP: 02-14-19   PATHOLOGY REPORTS (RELATED) NO    IMAGING (CT, MRI, US, XR)  Internal US abd: 08-28-23

## 2023-08-31 ENCOUNTER — PRE VISIT (OUTPATIENT)
Dept: SURGERY | Facility: CLINIC | Age: 28
End: 2023-08-31

## 2023-09-14 ENCOUNTER — MEDICAL CORRESPONDENCE (OUTPATIENT)
Dept: HEALTH INFORMATION MANAGEMENT | Facility: CLINIC | Age: 28
End: 2023-09-14
Payer: COMMERCIAL

## 2023-10-16 ENCOUNTER — MEDICAL CORRESPONDENCE (OUTPATIENT)
Dept: HEALTH INFORMATION MANAGEMENT | Facility: CLINIC | Age: 28
End: 2023-10-16
Payer: COMMERCIAL

## 2023-12-28 ENCOUNTER — MEDICAL CORRESPONDENCE (OUTPATIENT)
Dept: HEALTH INFORMATION MANAGEMENT | Facility: CLINIC | Age: 28
End: 2023-12-28
Payer: COMMERCIAL

## 2024-02-12 NOTE — PROGRESS NOTES
"Subjective :   Reports a little cramping that comes and goes     Objective:   /83 (BP Location: Right arm, Patient Position: Semi-[place]'s, Cuff Size: Adult Regular)   Pulse 80   Temp 98.5  F (36.9  C) (Oral)   Ht 1.549 m (5' 1\")   Wt 90.7 kg (200 lb)   LMP 2022   BMI 37.79 kg/m    SVE: deferred  FHTs 140 moderate variability, + accels, no deccels   Cxt q irreg, lasting   Membranes intact  Misoprostol 1203, 1401, 1602    Brief Labor Course:  23  1230   Admission to L&D  Three doses oral cytotec      Assessment:   IUP @ 39w0d  IOL for morbid obesity and hx of FGR  Cat 1 FHT tracing   Membranes  intact,   GBS negative  Non ripe cervix      Plan:   Continue with cervical ripening   Continue to observe and offer comfort  Anticipate          Mi Cassidy, DARRYL CNM   " ANJEL Abbasi

## 2024-03-04 ENCOUNTER — MEDICAL CORRESPONDENCE (OUTPATIENT)
Dept: HEALTH INFORMATION MANAGEMENT | Facility: CLINIC | Age: 29
End: 2024-03-04
Payer: COMMERCIAL

## 2024-07-13 ENCOUNTER — HEALTH MAINTENANCE LETTER (OUTPATIENT)
Age: 29
End: 2024-07-13

## 2024-12-18 ENCOUNTER — HOSPITAL ENCOUNTER (EMERGENCY)
Facility: CLINIC | Age: 29
Discharge: HOME | End: 2024-12-18
Admitting: PHYSICIAN ASSISTANT
Payer: COMMERCIAL

## 2024-12-18 VITALS
SYSTOLIC BLOOD PRESSURE: 110 MMHG | RESPIRATION RATE: 16 BRPM | HEIGHT: 62 IN | WEIGHT: 202 LBS | BODY MASS INDEX: 37.17 KG/M2 | HEART RATE: 85 BPM | DIASTOLIC BLOOD PRESSURE: 67 MMHG | OXYGEN SATURATION: 97 % | TEMPERATURE: 97.7 F

## 2024-12-18 DIAGNOSIS — K52.9 GASTROENTERITIS: ICD-10-CM

## 2024-12-18 PROCEDURE — 99284 EMERGENCY DEPT VISIT MOD MDM: CPT | Performed by: PHYSICIAN ASSISTANT

## 2024-12-18 PROCEDURE — 250N000011 HC RX IP 250 OP 636: Performed by: PHYSICIAN ASSISTANT

## 2024-12-18 PROCEDURE — 99283 EMERGENCY DEPT VISIT LOW MDM: CPT | Performed by: PHYSICIAN ASSISTANT

## 2024-12-18 RX ORDER — ONDANSETRON 4 MG/1
4 TABLET, ORALLY DISINTEGRATING ORAL ONCE
Status: COMPLETED | OUTPATIENT
Start: 2024-12-18 | End: 2024-12-18

## 2024-12-18 RX ORDER — ONDANSETRON 4 MG/1
4 TABLET, ORALLY DISINTEGRATING ORAL EVERY 8 HOURS PRN
Qty: 10 TABLET | Refills: 0 | Status: SHIPPED | OUTPATIENT
Start: 2024-12-18

## 2024-12-18 RX ADMIN — ONDANSETRON 4 MG: 4 TABLET, ORALLY DISINTEGRATING ORAL at 13:55

## 2024-12-18 ASSESSMENT — COLUMBIA-SUICIDE SEVERITY RATING SCALE - C-SSRS
1. IN THE PAST MONTH, HAVE YOU WISHED YOU WERE DEAD OR WISHED YOU COULD GO TO SLEEP AND NOT WAKE UP?: NO
2. HAVE YOU ACTUALLY HAD ANY THOUGHTS OF KILLING YOURSELF IN THE PAST MONTH?: NO
6. HAVE YOU EVER DONE ANYTHING, STARTED TO DO ANYTHING, OR PREPARED TO DO ANYTHING TO END YOUR LIFE?: NO

## 2024-12-18 ASSESSMENT — ACTIVITIES OF DAILY LIVING (ADL): ADLS_ACUITY_SCORE: 52

## 2024-12-18 NOTE — ED PROVIDER NOTES
ED Provider Note  [facility]      History     Chief Complaint   Patient presents with    Nausea, Vomiting, & Diarrhea     Patient presents due to nausea, vomiting and diarrhea that started at 0600 today and have been non-stop. Patient reports significant other having the same symptoms but he has improved. Both ate the same food last night, may be food poisoning. 7/10 cramping upper abdominal pain.     HPI  27yo F no pmhx p/w n/v/d x this morning.  Patient reports multiple episodes of nonbloody watery stool and NBNB emesis, with associated nausea.  Minimal abdominal pain around episodes of emesis only.  No fever or chills, urinary symptoms, vaginal symptoms, recent travel, ill contacts, recent antibiotic use.  Notably, her significant other has been ill with the same symptoms, after they have the same food last night.    Past Medical History  No past medical history on file.  No past surgical history on file.  acetaminophen (TYLENOL) 325 MG tablet  cyanocobalamin (VITAMIN B-12) 1000 MCG tablet  ibuprofen (ADVIL/MOTRIN) 600 MG tablet  ondansetron (ZOFRAN ODT) 4 MG ODT tab  Prenatal Multivit-Min-Fe-FA (PRENATAL, W/IRON & FA,) 27-0.8 MG TABS  senna-docusate (SENOKOT-S/PERICOLACE) 8.6-50 MG tablet  vitamin C (ASCORBIC ACID) 250 MG tablet  vitamin D3 (CHOLECALCIFEROL) 50 mcg (2000 units) tablet      No Known Allergies  Family History  Family History   Problem Relation Age of Onset    Hypertension Mother      Social History   Social History     Tobacco Use    Smoking status: Former     Current packs/day: 0.00     Types: Cigarettes     Quit date: 2019     Years since quittin.9    Smokeless tobacco: Never    Tobacco comments:     2 cigarettes/day   Vaping Use    Vaping status: Never Used   Substance Use Topics    Alcohol use: Not Currently    Drug use: No      A medically appropriate review of systems was performed with pertinent positives and negatives noted in the HPI, and all other  "systems negative.    Physical Exam   BP: 120/86  Pulse: 89  Temp: 97.7  F (36.5  C)  Resp: 16  Height: 157.5 cm (5' 2\")  Weight: 91.6 kg (202 lb)  SpO2: 98 %  Physical Exam  Constitutional:       General: She is not in acute distress.     Appearance: Normal appearance. She is not diaphoretic.   HENT:      Head: Atraumatic.      Mouth/Throat:      Mouth: Mucous membranes are moist.   Eyes:      Conjunctiva/sclera: Conjunctivae normal.   Cardiovascular:      Rate and Rhythm: Normal rate.   Pulmonary:      Effort: Pulmonary effort is normal.   Abdominal:      General: Abdomen is flat.      Palpations: Abdomen is soft.      Tenderness: There is no abdominal tenderness.   Musculoskeletal:      Cervical back: Neck supple.   Skin:     General: Skin is warm.   Neurological:      Mental Status: She is alert.           ED Course, Procedures, & Data      Procedures                No results found for any visits on 12/18/24.  Medications   ondansetron (ZOFRAN ODT) ODT tab 4 mg (has no administration in time range)     Labs Ordered and Resulted from Time of ED Arrival to Time of ED Departure - No data to display  No orders to display          Critical care was not performed.     Medical Decision Making  The patient's presentation was of moderate complexity (an undiagnosed new problem with uncertain prognosis).    The patient's evaluation involved:  review of external note(s) from 3+ sources (see separate area of note for details)  strong consideration of a test (see separate area of note for details) that was ultimately deferred    The patient's management necessitated moderate risk (prescription drug management including medications given in the ED).    Assessment & Plan    29yo F no pmhx p/w n/v/d x this morning I/s/o partner having same symptoms after having eaten the same food last night.  No fever or chills, travel, worse antibiotic use.  In ED patient is HF/AF, NAD, well-appearing.  She has benign abdomen.  DDx most likely " foodborne illness, unlikely C. difficile, acute abdomen, pregnancy versus other.  Discussed obtaining screening labs to rule out electrolyte abnormalities or pregnancy, but patient would prefer just to receive ODT Zofran, and defer testing at this time.  Following Zofran she was able to tolerate p.o., and was discharged with symptomatic care instructions, prescription for Zofran, strict ER return precautions for worsening symptoms.      I have reviewed the nursing notes. I have reviewed the findings, diagnosis, plan and need for follow up with the patient.    New Prescriptions    No medications on file       Final diagnoses:   Gastroenteritis         Jonathan Almazan PA-C  [facility] EMERGENCY DEPARTMENT  12/18/2024     Jonathan Almazan PA-C  12/18/24 1424

## 2024-12-18 NOTE — Clinical Note
Sarah Benavidesala Zhao was seen and treated in our emergency department on 12/18/2024.    Juan accompanied Sarah to the emergency department on the date listed above.      Sincerely,     [facility] Emergency Department

## 2024-12-18 NOTE — Clinical Note
Sarah Churchill was seen and treated in our emergency department on 12/18/2024.  She may return to work on 12/19/2024.       If you have any questions or concerns, please don't hesitate to call.      Jonathan Almazan PA-C

## 2024-12-18 NOTE — Clinical Note
Aracelired Meyer Zhao was seen and treated in our emergency department on 12/18/2024.         Sincerely,     [facility] Emergency Department

## 2024-12-18 NOTE — ED TRIAGE NOTES
Patient presents due to nausea, vomiting and diarrhea that started at 0600 today and have been non-stop. Patient reports significant other having the same symptoms but he has improved. Both ate the same food last night, may be food poisoning. 7/10 cramping upper abdominal pain.     Triage Assessment (Adult)       Row Name 12/18/24 1319          Triage Assessment    Airway WDL WDL        Respiratory WDL    Respiratory WDL WDL        Skin Circulation/Temperature WDL    Skin Circulation/Temperature WDL WDL        Cardiac WDL    Cardiac WDL WDL        Peripheral/Neurovascular WDL    Peripheral Neurovascular WDL WDL        Cognitive/Neuro/Behavioral WDL    Cognitive/Neuro/Behavioral WDL WDL

## 2024-12-18 NOTE — Clinical Note
Sarah Benavidesala Zhao was seen and treated in our emergency department on 12/18/2024.    Juan accompanied Sarah to the emergency department on the date listed above.      Sincerely,     [facility] Emergency Department

## 2025-01-03 ENCOUNTER — HOSPITAL ENCOUNTER (EMERGENCY)
Facility: CLINIC | Age: 30
Discharge: HOME | End: 2025-01-03
Attending: STUDENT IN AN ORGANIZED HEALTH CARE EDUCATION/TRAINING PROGRAM | Admitting: STUDENT IN AN ORGANIZED HEALTH CARE EDUCATION/TRAINING PROGRAM

## 2025-01-03 VITALS
BODY MASS INDEX: 37.95 KG/M2 | WEIGHT: 207.5 LBS | HEART RATE: 61 BPM | RESPIRATION RATE: 16 BRPM | DIASTOLIC BLOOD PRESSURE: 72 MMHG | TEMPERATURE: 97.7 F | OXYGEN SATURATION: 99 % | SYSTOLIC BLOOD PRESSURE: 108 MMHG

## 2025-01-03 DIAGNOSIS — R10.9 ABDOMINAL PAIN, UNSPECIFIED ABDOMINAL LOCATION: ICD-10-CM

## 2025-01-03 LAB
ALBUMIN SERPL BCG-MCNC: 4 G/DL (ref 3.5–5.2)
ALP SERPL-CCNC: 66 U/L (ref 40–150)
ALT SERPL W P-5'-P-CCNC: 29 U/L (ref 0–50)
ANION GAP SERPL CALCULATED.3IONS-SCNC: 13 MMOL/L (ref 7–15)
AST SERPL W P-5'-P-CCNC: 26 U/L (ref 0–45)
ATRIAL RATE - MUSE: 60 BPM
BASOPHILS # BLD AUTO: 0 10E3/UL (ref 0–0.2)
BASOPHILS NFR BLD AUTO: 0 %
BILIRUB SERPL-MCNC: 0.3 MG/DL
BUN SERPL-MCNC: 18.7 MG/DL (ref 6–20)
CALCIUM SERPL-MCNC: 8.5 MG/DL (ref 8.8–10.4)
CHLORIDE SERPL-SCNC: 107 MMOL/L (ref 98–107)
CREAT SERPL-MCNC: 0.56 MG/DL (ref 0.51–0.95)
DIASTOLIC BLOOD PRESSURE - MUSE: NORMAL MMHG
EGFRCR SERPLBLD CKD-EPI 2021: >90 ML/MIN/1.73M2
EOSINOPHIL # BLD AUTO: 0.1 10E3/UL (ref 0–0.7)
EOSINOPHIL NFR BLD AUTO: 1 %
ERYTHROCYTE [DISTWIDTH] IN BLOOD BY AUTOMATED COUNT: 11.9 % (ref 10–15)
GLUCOSE SERPL-MCNC: 101 MG/DL (ref 70–99)
HCG SERPL QL: NEGATIVE
HCO3 SERPL-SCNC: 20 MMOL/L (ref 22–29)
HCT VFR BLD AUTO: 38.3 % (ref 35–47)
HGB BLD-MCNC: 13.3 G/DL (ref 11.7–15.7)
IMM GRANULOCYTES # BLD: 0 10E3/UL
IMM GRANULOCYTES NFR BLD: 0 %
INTERPRETATION ECG - MUSE: NORMAL
LIPASE SERPL-CCNC: 29 U/L (ref 13–60)
LYMPHOCYTES # BLD AUTO: 1.8 10E3/UL (ref 0.8–5.3)
LYMPHOCYTES NFR BLD AUTO: 20 %
MCH RBC QN AUTO: 31.5 PG (ref 26.5–33)
MCHC RBC AUTO-ENTMCNC: 34.7 G/DL (ref 31.5–36.5)
MCV RBC AUTO: 91 FL (ref 78–100)
MONOCYTES # BLD AUTO: 0.7 10E3/UL (ref 0–1.3)
MONOCYTES NFR BLD AUTO: 8 %
NEUTROPHILS # BLD AUTO: 6.5 10E3/UL (ref 1.6–8.3)
NEUTROPHILS NFR BLD AUTO: 71 %
NRBC # BLD AUTO: 0 10E3/UL
NRBC BLD AUTO-RTO: 0 /100
P AXIS - MUSE: 48 DEGREES
PLATELET # BLD AUTO: 254 10E3/UL (ref 150–450)
POTASSIUM SERPL-SCNC: 3.7 MMOL/L (ref 3.4–5.3)
PR INTERVAL - MUSE: 140 MS
PROT SERPL-MCNC: 6.8 G/DL (ref 6.4–8.3)
QRS DURATION - MUSE: 84 MS
QT - MUSE: 422 MS
QTC - MUSE: 422 MS
R AXIS - MUSE: 44 DEGREES
RBC # BLD AUTO: 4.22 10E6/UL (ref 3.8–5.2)
SODIUM SERPL-SCNC: 140 MMOL/L (ref 135–145)
SYSTOLIC BLOOD PRESSURE - MUSE: NORMAL MMHG
T AXIS - MUSE: 11 DEGREES
TROPONIN T SERPL HS-MCNC: <6 NG/L
VENTRICULAR RATE- MUSE: 60 BPM
WBC # BLD AUTO: 9.2 10E3/UL (ref 4–11)

## 2025-01-03 PROCEDURE — 99284 EMERGENCY DEPT VISIT MOD MDM: CPT | Performed by: STUDENT IN AN ORGANIZED HEALTH CARE EDUCATION/TRAINING PROGRAM

## 2025-01-03 PROCEDURE — 85004 AUTOMATED DIFF WBC COUNT: CPT | Performed by: EMERGENCY MEDICINE

## 2025-01-03 PROCEDURE — 82040 ASSAY OF SERUM ALBUMIN: CPT | Performed by: EMERGENCY MEDICINE

## 2025-01-03 PROCEDURE — 93005 ELECTROCARDIOGRAM TRACING: CPT | Performed by: STUDENT IN AN ORGANIZED HEALTH CARE EDUCATION/TRAINING PROGRAM

## 2025-01-03 PROCEDURE — 84703 CHORIONIC GONADOTROPIN ASSAY: CPT | Performed by: EMERGENCY MEDICINE

## 2025-01-03 PROCEDURE — 84132 ASSAY OF SERUM POTASSIUM: CPT | Performed by: EMERGENCY MEDICINE

## 2025-01-03 PROCEDURE — 36415 COLL VENOUS BLD VENIPUNCTURE: CPT | Performed by: EMERGENCY MEDICINE

## 2025-01-03 PROCEDURE — 84484 ASSAY OF TROPONIN QUANT: CPT | Performed by: EMERGENCY MEDICINE

## 2025-01-03 PROCEDURE — 85041 AUTOMATED RBC COUNT: CPT | Performed by: EMERGENCY MEDICINE

## 2025-01-03 PROCEDURE — 83690 ASSAY OF LIPASE: CPT | Performed by: EMERGENCY MEDICINE

## 2025-01-03 PROCEDURE — 85014 HEMATOCRIT: CPT | Performed by: EMERGENCY MEDICINE

## 2025-01-03 ASSESSMENT — COLUMBIA-SUICIDE SEVERITY RATING SCALE - C-SSRS
2. HAVE YOU ACTUALLY HAD ANY THOUGHTS OF KILLING YOURSELF IN THE PAST MONTH?: NO
1. IN THE PAST MONTH, HAVE YOU WISHED YOU WERE DEAD OR WISHED YOU COULD GO TO SLEEP AND NOT WAKE UP?: NO
6. HAVE YOU EVER DONE ANYTHING, STARTED TO DO ANYTHING, OR PREPARED TO DO ANYTHING TO END YOUR LIFE?: NO

## 2025-01-03 ASSESSMENT — ACTIVITIES OF DAILY LIVING (ADL)
ADLS_ACUITY_SCORE: 56
ADLS_ACUITY_SCORE: 56

## 2025-01-03 NOTE — DISCHARGE INSTRUCTIONS
Please return to the emergency room if you develop any new or worsening symptoms  Here in the emergency room your workup was reassuring  Please follow-up with your primary care doctor in the next 1 to 2 days.

## 2025-01-03 NOTE — ED PROVIDER NOTES
"ED Provider Note  [facility]      History     Chief Complaint   Patient presents with    Abdominal Pain     Pt c/o waking up with upper gastric pain around 0440 am.. Pt c/o diarrhea x 1.5 weeks. PT notes ate some hot [place] last night.    Chest Pain     PT denies CP buit reports L upper feeling in chest like \"Strings are pulling on it.\"     HPI  Sarah Churchill is a 29 year old female with no significant past medical history presented to the emergency room for abdominal pain, vomiting, and diarrhea  Reports that last night she ate some spicy food and woke up this morning with epigastric abdominal discomfort and diarrhea with 1 episode of nonbloody nonbilious vomiting  When I saw the patient in room patient reports that she is feeling much better, no complaints, no nausea or abdominal pain  Reports that this feels similar to when this happened before, after she ate spicy food  Denies vaginal bleeding or discharge, fevers, chills, chest pain or shortness of breath            Physical Exam   BP: 108/72  Pulse: 67  Temp: 97.7  F (36.5  C)  Resp: 16  Weight: 94.1 kg (207 lb 8 oz)  SpO2: 96 %  Physical Exam  Constitutional:       General: She is not in acute distress.     Appearance: Normal appearance. She is not diaphoretic.   HENT:      Head: Atraumatic.      Mouth/Throat:      Mouth: Mucous membranes are moist.   Eyes:      General: No scleral icterus.     Conjunctiva/sclera: Conjunctivae normal.   Cardiovascular:      Rate and Rhythm: Normal rate.      Heart sounds: Normal heart sounds.   Pulmonary:      Effort: No respiratory distress.      Breath sounds: Normal breath sounds.   Abdominal:      General: Abdomen is flat. There is no distension.      Palpations: There is no mass.      Tenderness: There is no abdominal tenderness. There is no guarding or rebound.      Hernia: No hernia is present.   Musculoskeletal:      Cervical back: Neck supple.   Skin:     General: Skin is warm.      " Findings: No rash.   Neurological:      Mental Status: She is alert.           ED Course, Procedures, & Data      Procedures                Results for orders placed or performed during the hospital encounter of 01/03/25   CBC with platelets and differential     Status: None   Result Value Ref Range    WBC Count 9.2 4.0 - 11.0 10e3/uL    RBC Count 4.22 3.80 - 5.20 10e6/uL    Hemoglobin 13.3 11.7 - 15.7 g/dL    Hematocrit 38.3 35.0 - 47.0 %    MCV 91 78 - 100 fL    MCH 31.5 26.5 - 33.0 pg    MCHC 34.7 31.5 - 36.5 g/dL    RDW 11.9 10.0 - 15.0 %    Platelet Count 254 150 - 450 10e3/uL    % Neutrophils 71 %    % Lymphocytes 20 %    % Monocytes 8 %    % Eosinophils 1 %    % Basophils 0 %    % Immature Granulocytes 0 %    NRBCs per 100 WBC 0 <1 /100    Absolute Neutrophils 6.5 1.6 - 8.3 10e3/uL    Absolute Lymphocytes 1.8 0.8 - 5.3 10e3/uL    Absolute Monocytes 0.7 0.0 - 1.3 10e3/uL    Absolute Eosinophils 0.1 0.0 - 0.7 10e3/uL    Absolute Basophils 0.0 0.0 - 0.2 10e3/uL    Absolute Immature Granulocytes 0.0 <=0.4 10e3/uL    Absolute NRBCs 0.0 10e3/uL   CBC with platelets differential     Status: None    Narrative    The following orders were created for panel order CBC with platelets differential.  Procedure                               Abnormality         Status                     ---------                               -----------         ------                     CBC with platelets and d...[[id number]]                      Final result                 Please view results for these tests on the individual orders.     Medications - No data to display  Labs Ordered and Resulted from Time of ED Arrival to Time of ED Departure   CBC WITH PLATELETS AND DIFFERENTIAL       Result Value    WBC Count 9.2      RBC Count 4.22      Hemoglobin 13.3      Hematocrit 38.3      MCV 91      MCH 31.5      MCHC 34.7      RDW 11.9      Platelet Count 254      % Neutrophils 71      % Lymphocytes 20      % Monocytes 8      % Eosinophils 1       % Basophils 0      % Immature Granulocytes 0      NRBCs per 100 WBC 0      Absolute Neutrophils 6.5      Absolute Lymphocytes 1.8      Absolute Monocytes 0.7      Absolute Eosinophils 0.1      Absolute Basophils 0.0      Absolute Immature Granulocytes 0.0      Absolute NRBCs 0.0     COMPREHENSIVE METABOLIC PANEL   LIPASE   TROPONIN T, HIGH SENSITIVITY   HCG QUALITATIVE PREGNANCY     No orders to display          Critical care was not performed.     Medical Decision Making  The patient's presentation was of high complexity (an acute health issue posing potential threat to life or bodily function).    The patient's evaluation involved:  review of external note(s) from 3+ sources (see separate area of note for details)  review of 3+ test result(s) ordered prior to this encounter (see separate area of note for details)  strong consideration of a test (CT abdomen pelvis) that was ultimately deferred  ordering and/or review of 3+ test(s) in this encounter (see separate area of note for details)    The patient's management necessitated moderate risk (ordering review of multiple labs, consideration of CT scan, disposition home).    Assessment & Plan    Patient's labs as reviewed and interpreted by me were reassuring  As patient has diarrhea, abdominal discomfort that improved without intervention, reassuring labs, reassuring vital signs, and a reassuring physical exam, at this point patient likely with gastritis or discomfort after eating spicy food, possibly GERD, reflux, but given reassuring vital signs, physical exam, labs, less likely acute medical emergency  Although I consider CT scan of stomach, as patient is well-appearing here with a reassuring physical exam and workup, will defer further workup at this point  Encourage patient to be careful eating spicy foods before bed, and to follow-up with her primary care doctor in the next 2 to 3 days  Patient given strict return precautions come back to the emergency  room for any new or worsening symptoms.    I have reviewed the nursing notes. I have reviewed the findings, diagnosis, plan and need for follow up with the patient.    New Prescriptions    No medications on file       Final diagnoses:   None       Keith Cardenas MD on 1/3/2025 at 7:40 AM   [facility] EMERGENCY DEPARTMENT  1/3/2025     Keith Cardenas MD  01/03/25 0740

## 2025-01-08 ENCOUNTER — OFFICE VISIT (OUTPATIENT)
Dept: FAMILY MEDICINE | Facility: CLINIC | Age: 30
End: 2025-01-08

## 2025-01-08 VITALS
DIASTOLIC BLOOD PRESSURE: 71 MMHG | HEIGHT: 62 IN | OXYGEN SATURATION: 97 % | BODY MASS INDEX: 37.45 KG/M2 | HEART RATE: 74 BPM | WEIGHT: 203.5 LBS | TEMPERATURE: 97.9 F | SYSTOLIC BLOOD PRESSURE: 103 MMHG

## 2025-01-08 DIAGNOSIS — R10.13 EPIGASTRIC PAIN: ICD-10-CM

## 2025-01-08 DIAGNOSIS — Z12.4 CERVICAL CANCER SCREENING: Primary | ICD-10-CM

## 2025-01-08 DIAGNOSIS — R19.7 DIARRHEA OF PRESUMED INFECTIOUS ORIGIN: ICD-10-CM

## 2025-01-08 DIAGNOSIS — K29.00 ACUTE GASTRITIS WITHOUT HEMORRHAGE, UNSPECIFIED GASTRITIS TYPE: Primary | ICD-10-CM

## 2025-01-08 PROBLEM — O26.842 SIZE OF FETUS INCONSISTENT WITH DATES IN SECOND TRIMESTER: Status: RESOLVED | Noted: 2023-01-17 | Resolved: 2025-01-08

## 2025-01-08 PROBLEM — Z34.90 ENCOUNTER FOR INDUCTION OF LABOR: Status: RESOLVED | Noted: 2023-08-12 | Resolved: 2025-01-08

## 2025-01-08 PROBLEM — O09.529 SUPERVISION OF HIGH-RISK PREGNANCY OF ELDERLY MULTIGRAVIDA: Status: RESOLVED | Noted: 2023-07-07 | Resolved: 2025-01-08

## 2025-01-08 PROBLEM — O36.5990 PREGNANCY AFFECTED BY FETAL GROWTH RESTRICTION: Status: RESOLVED | Noted: 2023-06-08 | Resolved: 2025-01-08

## 2025-01-08 PROBLEM — Z23 NEED FOR TDAP VACCINATION: Status: RESOLVED | Noted: 2023-02-09 | Resolved: 2025-01-08

## 2025-01-08 PROBLEM — Z34.00 SUPERVISION OF NORMAL FIRST PREGNANCY, ANTEPARTUM: Status: RESOLVED | Noted: 2023-02-14 | Resolved: 2025-01-08

## 2025-01-08 PROCEDURE — 99214 OFFICE O/P EST MOD 30 MIN: CPT | Performed by: FAMILY MEDICINE

## 2025-01-08 PROCEDURE — 87338 HPYLORI STOOL AG IA: CPT | Performed by: FAMILY MEDICINE

## 2025-01-08 ASSESSMENT — PAIN SCALES - GENERAL: PAINLEVEL_OUTOF10: NO PAIN (0)

## 2025-01-08 NOTE — PROGRESS NOTES
"Assessment/Plan.    Problem   Epigastric Pain    Suspect gastritis or PUD.  -check H pylori. Avoid stomach meds until sample is collected  -if H pylori-negative, will treat w/ omeprazole for 30d     Diarrhea    Suspect viral given recent confirmed influenza in family members. Also suspect possible recent norovirus infection within household.  -observe  -if persists for another week, consider stool studies       Orders Placed This Encounter   Procedures    Helicobacter pylori Antigen Stool       ----  Chief complaint: ER F/U    Social History     Social History Narrative    -Lives with daughter (born 8/2023)     ED visit 12/18  Emesis, loose stools  Symptoms improved, but never resolved    ED visit 1/3  Epigastric pain starting around 1/1  Emesis, loose stools    Loose stools intermittently for months  Essentially all stools have been loose since 12/18  7-8 BMs/d  No blood in stool, no mucus in stool  Dark-appearing stool yesterday, but then returned to lighter color    Nausea  Last emesis 4d ago    Epigastric pain  Occasional radiation to right shoulder  Crampy  Worse after eating  Often associated w/ loose stool episodes  Intense reflux   -in past, only an issue when pregnant    Headache    Fever around 12/18, resolved  No recent chills    Chronic menorrhagia, no recent worsening  Pain doesn't feel like uterine cramps  Occasional vaginal discharge, no recent worsening  No recent dysuria or hematuria    S/p cholecystectomy  Urine hCG negative in ED, no missed menses  Daughter and sister positive for fluB  No hospitalization or antibiotic use in past 3 mos  No known C diff exposure  No hx PUD, no prior EGD    Tried spicy food restriction, Pepto, Excedrin, Tums, Theraflu, ondansetron    Exam  /71 (BP Location: Left arm, Patient Position: Sitting, Cuff Size: Adult Large)   Pulse 74   Temp 97.9  F (36.6  C) (Temporal)   Ht 1.573 m (5' 1.93\")   Wt 92.3 kg (203 lb 8 oz)   LMP 12/27/2024 (Approximate)   SpO2 " 97%   BMI 37.31 kg/m      oropharynx without abnormal masses  No thyromegaly  lung fields CTAB  Abdomen soft, epigastric and periumbilical tenderness without guarding, no palpable masses  no lower leg edema

## 2025-01-08 NOTE — PROGRESS NOTES
"  {PROVIDER CHARTING PREFERENCE:830162}    Baljeet Smith is a 29 year old, presenting for the following health issues:  ER F/U      1/8/2025    10:52 AM   Additional Questions   Roomed by Allison MCDUFFIE     {MA/LPN/RN Pre-Provider Visit Orders- hCG/UA/Strep (Optional):977896}  ED/UC Followup:    Facility:  [facility] Emergency Department   Date of visit: 01/03/2025  Reason for visit: Abdominal pain, unspecified abdominal location   Current Status: \"Still the same \" pt reports that she is experiencing intense pains about an hour after eating. This is her second ER visit since 12/18/2024. Pt believes she had the noro visus during the 12/18 visit, and is having symptoms of nausea, diarrhea and heartburn.     {additonal problems for provider to add (Optional):053633}    {ROS Picklists (Optional):965392}      Objective    /71 (BP Location: Left arm, Patient Position: Sitting, Cuff Size: Adult Large)   Pulse 74   Temp 97.9  F (36.6  C) (Temporal)   Ht 1.573 m (5' 1.93\")   Wt 92.3 kg (203 lb 8 oz)   LMP 12/27/2024 (Approximate)   SpO2 97%   BMI 37.31 kg/m    Body mass index is 37.31 kg/m .  Physical Exam   {Exam List (Optional):765557}    {Diagnostic Test Results (Optional):448037}        Signed Electronically by: Marvin Zuniga MD  {Email feedback regarding this note to [email or web address]   :[id number]}  "

## 2025-01-09 LAB — H PYLORI AG STL QL IA: NEGATIVE

## 2025-01-14 PROBLEM — E66.01 MORBID OBESITY (H): Status: RESOLVED | Noted: 2021-10-27 | Resolved: 2025-01-14

## 2025-07-19 ENCOUNTER — HEALTH MAINTENANCE LETTER (OUTPATIENT)
Age: 30
End: 2025-07-19

## 2025-08-07 ENCOUNTER — HOSPITAL ENCOUNTER (EMERGENCY)
Facility: CLINIC | Age: 30
Discharge: HOME | End: 2025-08-07
Attending: EMERGENCY MEDICINE

## 2025-08-07 VITALS
BODY MASS INDEX: 36.8 KG/M2 | WEIGHT: 200 LBS | HEIGHT: 62 IN | RESPIRATION RATE: 16 BRPM | TEMPERATURE: 98.3 F | OXYGEN SATURATION: 99 % | DIASTOLIC BLOOD PRESSURE: 80 MMHG | HEART RATE: 73 BPM | SYSTOLIC BLOOD PRESSURE: 115 MMHG

## 2025-08-07 DIAGNOSIS — R51.9 FACIAL PAIN: Primary | ICD-10-CM

## 2025-08-07 LAB
FLUAV RNA SPEC QL NAA+PROBE: NEGATIVE
FLUBV RNA RESP QL NAA+PROBE: NEGATIVE
RSV RNA SPEC NAA+PROBE: NEGATIVE
S PYO DNA THROAT QL NAA+PROBE: NOT DETECTED
SARS-COV-2 RNA RESP QL NAA+PROBE: NEGATIVE

## 2025-08-07 PROCEDURE — 87637 SARSCOV2&INF A&B&RSV AMP PRB: CPT | Performed by: PHYSICIAN ASSISTANT

## 2025-08-07 PROCEDURE — 99283 EMERGENCY DEPT VISIT LOW MDM: CPT | Performed by: EMERGENCY MEDICINE

## 2025-08-07 PROCEDURE — 87651 STREP A DNA AMP PROBE: CPT | Performed by: EMERGENCY MEDICINE

## 2025-08-07 PROCEDURE — 87651 STREP A DNA AMP PROBE: CPT | Performed by: STUDENT IN AN ORGANIZED HEALTH CARE EDUCATION/TRAINING PROGRAM

## 2025-08-07 RX ORDER — VALACYCLOVIR HYDROCHLORIDE 1 G/1
1000 TABLET, FILM COATED ORAL 3 TIMES DAILY
Qty: 21 TABLET | Refills: 0 | Status: SHIPPED | OUTPATIENT
Start: 2025-08-07 | End: 2025-08-14

## 2025-08-07 ASSESSMENT — ACTIVITIES OF DAILY LIVING (ADL)
ADLS_ACUITY_SCORE: 52
ADLS_ACUITY_SCORE: 52

## 2025-08-07 ASSESSMENT — COLUMBIA-SUICIDE SEVERITY RATING SCALE - C-SSRS
2. HAVE YOU ACTUALLY HAD ANY THOUGHTS OF KILLING YOURSELF IN THE PAST MONTH?: NO
6. HAVE YOU EVER DONE ANYTHING, STARTED TO DO ANYTHING, OR PREPARED TO DO ANYTHING TO END YOUR LIFE?: NO
1. IN THE PAST MONTH, HAVE YOU WISHED YOU WERE DEAD OR WISHED YOU COULD GO TO SLEEP AND NOT WAKE UP?: NO